# Patient Record
Sex: FEMALE | NOT HISPANIC OR LATINO | Employment: STUDENT | URBAN - METROPOLITAN AREA
[De-identification: names, ages, dates, MRNs, and addresses within clinical notes are randomized per-mention and may not be internally consistent; named-entity substitution may affect disease eponyms.]

---

## 2023-12-14 ENCOUNTER — TELEPHONE (OUTPATIENT)
Dept: INFECTIOUS DISEASES | Facility: CLINIC | Age: 58
End: 2023-12-14
Payer: COMMERCIAL

## 2023-12-14 NOTE — TELEPHONE ENCOUNTER
Called pt, no answer. Left voicemail about needing a referral for scheduling and to give the office a call back.

## 2023-12-14 NOTE — TELEPHONE ENCOUNTER
----- Message from Elizabeth Morse MA sent at 12/13/2023  4:35 PM CST -----  Judith Mensah Staff  Caller: [phone] (Today, 12:13 PM)  Pt calling in to schedule  np apt please  call to discuss further

## 2024-01-04 ENCOUNTER — OFFICE VISIT (OUTPATIENT)
Dept: INFECTIOUS DISEASES | Facility: CLINIC | Age: 59
End: 2024-01-04
Payer: COMMERCIAL

## 2024-01-04 ENCOUNTER — CLINICAL SUPPORT (OUTPATIENT)
Dept: INFECTIOUS DISEASES | Facility: CLINIC | Age: 59
End: 2024-01-04
Payer: COMMERCIAL

## 2024-01-04 ENCOUNTER — LAB VISIT (OUTPATIENT)
Dept: LAB | Facility: HOSPITAL | Age: 59
End: 2024-01-04
Attending: INTERNAL MEDICINE
Payer: COMMERCIAL

## 2024-01-04 VITALS
HEIGHT: 67 IN | BODY MASS INDEX: 31.21 KG/M2 | TEMPERATURE: 98 F | WEIGHT: 198.88 LBS | HEART RATE: 90 BPM | DIASTOLIC BLOOD PRESSURE: 79 MMHG | SYSTOLIC BLOOD PRESSURE: 135 MMHG

## 2024-01-04 DIAGNOSIS — B20 HUMAN IMMUNODEFICIENCY VIRUS (HIV) DISEASE: Primary | ICD-10-CM

## 2024-01-04 DIAGNOSIS — Z00.00 HEALTH MAINTENANCE EXAMINATION: ICD-10-CM

## 2024-01-04 DIAGNOSIS — B20 HUMAN IMMUNODEFICIENCY VIRUS (HIV) DISEASE: ICD-10-CM

## 2024-01-04 DIAGNOSIS — M17.0 PRIMARY OSTEOARTHRITIS OF BOTH KNEES: ICD-10-CM

## 2024-01-04 DIAGNOSIS — Z23 IMMUNIZATION DUE: ICD-10-CM

## 2024-01-04 LAB
ALBUMIN SERPL BCP-MCNC: 4.5 G/DL (ref 3.5–5.2)
ALP SERPL-CCNC: 66 U/L (ref 55–135)
ALT SERPL W/O P-5'-P-CCNC: 16 U/L (ref 10–44)
ANION GAP SERPL CALC-SCNC: 12 MMOL/L (ref 8–16)
AST SERPL-CCNC: 23 U/L (ref 10–40)
BASOPHILS # BLD AUTO: 0.06 K/UL (ref 0–0.2)
BASOPHILS NFR BLD: 0.8 % (ref 0–1.9)
BILIRUB SERPL-MCNC: 0.7 MG/DL (ref 0.1–1)
BILIRUB UR QL STRIP: NEGATIVE
BUN SERPL-MCNC: 19 MG/DL (ref 6–20)
CALCIUM SERPL-MCNC: 10.3 MG/DL (ref 8.7–10.5)
CHLORIDE SERPL-SCNC: 105 MMOL/L (ref 95–110)
CLARITY UR REFRACT.AUTO: CLEAR
CO2 SERPL-SCNC: 24 MMOL/L (ref 23–29)
COLOR UR AUTO: YELLOW
CREAT SERPL-MCNC: 1.1 MG/DL (ref 0.5–1.4)
DIFFERENTIAL METHOD BLD: ABNORMAL
EOSINOPHIL # BLD AUTO: 0 K/UL (ref 0–0.5)
EOSINOPHIL NFR BLD: 0.6 % (ref 0–8)
ERYTHROCYTE [DISTWIDTH] IN BLOOD BY AUTOMATED COUNT: 12.9 % (ref 11.5–14.5)
EST. GFR  (NO RACE VARIABLE): 58.6 ML/MIN/1.73 M^2
GLUCOSE SERPL-MCNC: 105 MG/DL (ref 70–110)
GLUCOSE UR QL STRIP: NEGATIVE
HAV IGG SER QL IA: REACTIVE
HBV CORE AB SERPL QL IA: NORMAL
HBV SURFACE AB SER-ACNC: 15.56 MIU/ML
HBV SURFACE AB SER-ACNC: REACTIVE M[IU]/ML
HBV SURFACE AG SERPL QL IA: NORMAL
HCT VFR BLD AUTO: 43 % (ref 37–48.5)
HCV AB SERPL QL IA: NORMAL
HGB BLD-MCNC: 14.5 G/DL (ref 12–16)
HGB UR QL STRIP: ABNORMAL
IMM GRANULOCYTES # BLD AUTO: 0.02 K/UL (ref 0–0.04)
IMM GRANULOCYTES NFR BLD AUTO: 0.3 % (ref 0–0.5)
KETONES UR QL STRIP: NEGATIVE
LEUKOCYTE ESTERASE UR QL STRIP: NEGATIVE
LYMPHOCYTES # BLD AUTO: 2.6 K/UL (ref 1–4.8)
LYMPHOCYTES NFR BLD: 36.4 % (ref 18–48)
MCH RBC QN AUTO: 31.2 PG (ref 27–31)
MCHC RBC AUTO-ENTMCNC: 33.7 G/DL (ref 32–36)
MCV RBC AUTO: 93 FL (ref 82–98)
MICROSCOPIC COMMENT: NORMAL
MONOCYTES # BLD AUTO: 0.5 K/UL (ref 0.3–1)
MONOCYTES NFR BLD: 7.4 % (ref 4–15)
NEUTROPHILS # BLD AUTO: 4 K/UL (ref 1.8–7.7)
NEUTROPHILS NFR BLD: 54.5 % (ref 38–73)
NITRITE UR QL STRIP: NEGATIVE
NRBC BLD-RTO: 0 /100 WBC
PH UR STRIP: 5 [PH] (ref 5–8)
PLATELET # BLD AUTO: 234 K/UL (ref 150–450)
PMV BLD AUTO: 12.5 FL (ref 9.2–12.9)
POTASSIUM SERPL-SCNC: 3.6 MMOL/L (ref 3.5–5.1)
PROT SERPL-MCNC: 8.5 G/DL (ref 6–8.4)
PROT UR QL STRIP: NEGATIVE
RBC # BLD AUTO: 4.65 M/UL (ref 4–5.4)
RBC #/AREA URNS AUTO: 0 /HPF (ref 0–4)
SODIUM SERPL-SCNC: 141 MMOL/L (ref 136–145)
SP GR UR STRIP: 1.02 (ref 1–1.03)
SQUAMOUS #/AREA URNS AUTO: 1 /HPF
URN SPEC COLLECT METH UR: ABNORMAL
WBC # BLD AUTO: 7.26 K/UL (ref 3.9–12.7)
WBC #/AREA URNS AUTO: 2 /HPF (ref 0–5)

## 2024-01-04 PROCEDURE — 86361 T CELL ABSOLUTE COUNT: CPT | Performed by: INTERNAL MEDICINE

## 2024-01-04 PROCEDURE — 86790 VIRUS ANTIBODY NOS: CPT | Performed by: INTERNAL MEDICINE

## 2024-01-04 PROCEDURE — 3075F SYST BP GE 130 - 139MM HG: CPT | Mod: CPTII,S$GLB,, | Performed by: INTERNAL MEDICINE

## 2024-01-04 PROCEDURE — 91322 SARSCOV2 VAC 50 MCG/0.5ML IM: CPT | Mod: S$GLB,,, | Performed by: INTERNAL MEDICINE

## 2024-01-04 PROCEDURE — 90480 ADMN SARSCOV2 VAC 1/ONLY CMP: CPT | Mod: S$GLB,,, | Performed by: INTERNAL MEDICINE

## 2024-01-04 PROCEDURE — 99999 PR PBB SHADOW E&M-EST. PATIENT-LVL III: CPT | Mod: PBBFAC,,, | Performed by: INTERNAL MEDICINE

## 2024-01-04 PROCEDURE — 99204 OFFICE O/P NEW MOD 45 MIN: CPT | Mod: S$GLB,,, | Performed by: INTERNAL MEDICINE

## 2024-01-04 PROCEDURE — 85025 COMPLETE CBC W/AUTO DIFF WBC: CPT | Performed by: INTERNAL MEDICINE

## 2024-01-04 PROCEDURE — 80053 COMPREHEN METABOLIC PANEL: CPT | Performed by: INTERNAL MEDICINE

## 2024-01-04 PROCEDURE — 1159F MED LIST DOCD IN RCRD: CPT | Mod: CPTII,S$GLB,, | Performed by: INTERNAL MEDICINE

## 2024-01-04 PROCEDURE — 87491 CHLMYD TRACH DNA AMP PROBE: CPT | Performed by: INTERNAL MEDICINE

## 2024-01-04 PROCEDURE — 81001 URINALYSIS AUTO W/SCOPE: CPT | Performed by: INTERNAL MEDICINE

## 2024-01-04 PROCEDURE — 3008F BODY MASS INDEX DOCD: CPT | Mod: CPTII,S$GLB,, | Performed by: INTERNAL MEDICINE

## 2024-01-04 PROCEDURE — 36415 COLL VENOUS BLD VENIPUNCTURE: CPT | Performed by: INTERNAL MEDICINE

## 2024-01-04 PROCEDURE — 86803 HEPATITIS C AB TEST: CPT | Performed by: INTERNAL MEDICINE

## 2024-01-04 PROCEDURE — 3078F DIAST BP <80 MM HG: CPT | Mod: CPTII,S$GLB,, | Performed by: INTERNAL MEDICINE

## 2024-01-04 PROCEDURE — 87536 HIV-1 QUANT&REVRSE TRNSCRPJ: CPT | Performed by: INTERNAL MEDICINE

## 2024-01-04 PROCEDURE — 87340 HEPATITIS B SURFACE AG IA: CPT | Performed by: INTERNAL MEDICINE

## 2024-01-04 PROCEDURE — 99999 PR PBB SHADOW E&M-EST. PATIENT-LVL I: CPT | Mod: PBBFAC,,,

## 2024-01-04 PROCEDURE — 86706 HEP B SURFACE ANTIBODY: CPT | Performed by: INTERNAL MEDICINE

## 2024-01-04 PROCEDURE — 1160F RVW MEDS BY RX/DR IN RCRD: CPT | Mod: CPTII,S$GLB,, | Performed by: INTERNAL MEDICINE

## 2024-01-04 PROCEDURE — 86704 HEP B CORE ANTIBODY TOTAL: CPT | Performed by: INTERNAL MEDICINE

## 2024-01-04 PROCEDURE — 86592 SYPHILIS TEST NON-TREP QUAL: CPT | Performed by: INTERNAL MEDICINE

## 2024-01-04 RX ORDER — DOLUTEGRAVIR SODIUM AND LAMIVUDINE 50; 300 MG/1; MG/1
1 TABLET, FILM COATED ORAL DAILY
Qty: 90 TABLET | Refills: 1 | Status: SHIPPED | OUTPATIENT
Start: 2024-01-04 | End: 2024-06-19 | Stop reason: SDUPTHER

## 2024-01-04 RX ORDER — DOLUTEGRAVIR SODIUM AND LAMIVUDINE 50; 300 MG/1; MG/1
TABLET, FILM COATED ORAL
COMMUNITY
End: 2024-01-04 | Stop reason: SDUPTHER

## 2024-01-04 NOTE — PROGRESS NOTES
Subjective:      Patient ID: Erin Johnson is a 58 y.o. female.    Chief Complaint:HIV Positive/AIDS      History of Present Illness    58 year with hIV diagnosed in .  In  contracted PJP while pregnant.  Hospitalized, intubated.  Started taking medications after her discharge.  She was started HAART.  She had shingles while her immune system was improving.  She is now currently on Dovato.  She has been tolerating Dovato very well.    Medical History  HIV diagnosed in .  Arthritis of the knees.    Surgical History  Right Knee surgery 2010    Medications  Dovato    Family History  Father  of colon cancer at age 60  Father had type 2 diabetes  Glaucoma  Skin cancer  Mother is 87 years of age    Social History  Occasionally smokes tobacco.  Occasionally drinks ETOH.  No illicit.  Living in [place] X 5 months.  Originally from [place].  Born and raised in [facility].  She has lived in [place], [place] and [place].  She has a 19 year old son. Speaks Maori.  No pets at home.  Lives alone.  Son lives in [place].  Worked in public health as a  (for 12 years).  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      Has a bad reaction to covid shots (manifests as headaches).    Colonoscopy was done last year.  Pap smear last year.      Review of Systems   Constitutional: Negative for chills, decreased appetite, fever, malaise/fatigue, night sweats, weight gain and weight loss.   HENT:  Negative for congestion, ear pain, hearing loss, hoarse voice, sore throat and tinnitus.    Eyes:  Negative for blurred vision, redness and visual disturbance.   Cardiovascular:  Negative for chest pain, leg swelling and palpitations.   Respiratory:  Negative for cough, hemoptysis, shortness of breath, sputum production and wheezing.    Hematologic/Lymphatic: Negative for adenopathy. Does not bruise/bleed easily.   Skin:  Negative for dry skin, itching, rash and  suspicious lesions.   Musculoskeletal:  Negative for back pain, joint pain, myalgias and neck pain.   Gastrointestinal:  Negative for abdominal pain, constipation, diarrhea, heartburn, nausea and vomiting.   Genitourinary:  Negative for dysuria, flank pain, frequency, hematuria, hesitancy and urgency.   Neurological:  Negative for dizziness, headaches, numbness, paresthesias and weakness.   Psychiatric/Behavioral:  Negative for depression and memory loss. The patient does not have insomnia and is not nervous/anxious.    Objective:   Physical Exam  Vitals and nursing note reviewed.   Constitutional:       General: She is not in acute distress.     Appearance: She is well-developed. She is not diaphoretic.   HENT:      Head: Normocephalic and atraumatic.      Right Ear: External ear normal.      Left Ear: External ear normal.      Nose: Nose normal.      Mouth/Throat:      Pharynx: No oropharyngeal exudate.   Eyes:      General: No scleral icterus.        Right eye: No discharge.         Left eye: No discharge.      Conjunctiva/sclera: Conjunctivae normal.      Pupils: Pupils are equal, round, and reactive to light.   Neck:      Thyroid: No thyromegaly.      Vascular: No JVD.      Trachea: No tracheal deviation.   Cardiovascular:      Rate and Rhythm: Normal rate and regular rhythm.      Heart sounds: No murmur heard.     No friction rub. No gallop.   Pulmonary:      Effort: Pulmonary effort is normal. No respiratory distress.      Breath sounds: Normal breath sounds. No stridor. No wheezing or rales.   Chest:      Chest wall: No tenderness.   Abdominal:      General: Bowel sounds are normal. There is no distension.      Palpations: Abdomen is soft. There is no mass.      Tenderness: There is no abdominal tenderness. There is no guarding or rebound.   Musculoskeletal:         General: No tenderness. Normal range of motion.      Cervical back: Normal range of motion and neck supple.   Lymphadenopathy:      Cervical: No  cervical adenopathy.   Skin:     General: Skin is warm.      Coloration: Skin is not pale.      Findings: No erythema or rash.   Neurological:      Mental Status: She is alert and oriented to person, place, and time.      Cranial Nerves: No cranial nerve deficit.      Motor: No abnormal muscle tone.      Coordination: Coordination normal.      Deep Tendon Reflexes: Reflexes normal.   Psychiatric:         Behavior: Behavior normal.         Thought Content: Thought content normal.         Judgment: Judgment normal.       Assessment:     1. Human immunodeficiency virus (HIV) disease :  Continue on dovato.  Will check labs today.   2. Health maintenance examination :  Will order mammogram.   3. Primary osteoarthritis of both knees :  Will check x-ray of knees.   4. Immunization due :  Will give covid vaccine.       Plan:      Human immunodeficiency virus (HIV) disease  -     CD4 T-[place] Cells; Future; Expected date: 01/04/2024  -     Comprehensive Metabolic Panel; Future; Expected date: 01/04/2024  -     HIV RNA, Quantitative, PCR; Future; Expected date: 01/04/2024  -     CBC Auto Differential; Future; Expected date: 01/04/2024  -     RPR; Future; Expected date: 01/04/2024  -     FTA antibodies, IgG and IgM; Future; Expected date: 01/03/2025  -     Hepatitis B Core Antibody, Total; Future; Expected date: 01/04/2024  -     Hepatitis B Surface Ab, Qualitative; Future; Expected date: 01/04/2024  -     Hepatitis B Surface Antigen; Future; Expected date: 01/04/2024  -     Hepatitis C Antibody; Future; Expected date: 01/04/2024  -     Hepatitis A antibody, IgG; Future; Expected date: 01/04/2024  -     Urinalysis  -     C. trachomatis/N. gonorrhoeae by AMP DNA  -     DOVATO  mg Tab; Take 1 tablet by mouth Daily.  Dispense: 90 tablet; Refill: 1    Health maintenance examination  -     Mammo Digital Diagnostic Bilat; Future; Expected date: 01/04/2024    Primary osteoarthritis of both knees  -     X-Ray Knee 3 View  Bilateral; Future; Expected date: 01/04/2024    Immunization due  -     COVID-19 VAC, MRNA 2023 (MODERNA)(PF) 50 MCG/0.5 ML IM SUSR (12 YRS AND UP); Future; Expected date: 01/04/2024    Other orders  -     Urinalysis Microscopic

## 2024-01-04 NOTE — PROGRESS NOTES
Patient received covid Moderna spike vax IM to the right deltoid.  Tolerated well and left in NAD

## 2024-01-05 LAB
C TRACH DNA SPEC QL NAA+PROBE: NOT DETECTED
CD3+CD4+ CELLS # BLD: 949 CELLS/UL (ref 300–1400)
CD3+CD4+ CELLS NFR BLD: 38.9 % (ref 28–57)
HIV1 RNA # SERPL NAA+PROBE: NOT DETECTED COPIES/ML
HIV1 RNA SERPL QL NAA+PROBE: NOT DETECTED
N GONORRHOEA DNA SPEC QL NAA+PROBE: NOT DETECTED
RPR SER QL: NORMAL

## 2024-02-26 ENCOUNTER — TELEPHONE (OUTPATIENT)
Dept: INFECTIOUS DISEASES | Facility: CLINIC | Age: 59
End: 2024-02-26
Payer: COMMERCIAL

## 2024-04-02 ENCOUNTER — HOSPITAL ENCOUNTER (OUTPATIENT)
Dept: RADIOLOGY | Facility: HOSPITAL | Age: 59
Discharge: HOME | End: 2024-04-02
Attending: INTERNAL MEDICINE
Payer: COMMERCIAL

## 2024-04-02 DIAGNOSIS — M17.0 PRIMARY OSTEOARTHRITIS OF BOTH KNEES: ICD-10-CM

## 2024-04-02 PROCEDURE — 73562 X-RAY EXAM OF KNEE 3: CPT | Mod: TC,50

## 2024-04-02 PROCEDURE — 73562 X-RAY EXAM OF KNEE 3: CPT | Mod: 26,50,, | Performed by: RADIOLOGY

## 2024-06-19 DIAGNOSIS — B20 HUMAN IMMUNODEFICIENCY VIRUS (HIV) DISEASE: ICD-10-CM

## 2024-06-19 RX ORDER — DOLUTEGRAVIR SODIUM AND LAMIVUDINE 50; 300 MG/1; MG/1
1 TABLET, FILM COATED ORAL DAILY
Qty: 90 TABLET | Refills: 1 | Status: SHIPPED | OUTPATIENT
Start: 2024-06-19 | End: 2024-12-16

## 2024-07-25 ENCOUNTER — OFFICE VISIT (OUTPATIENT)
Dept: INFECTIOUS DISEASES | Facility: CLINIC | Age: 59
End: 2024-07-25
Payer: COMMERCIAL

## 2024-07-25 ENCOUNTER — LAB VISIT (OUTPATIENT)
Dept: LAB | Facility: HOSPITAL | Age: 59
End: 2024-07-25
Attending: INTERNAL MEDICINE
Payer: COMMERCIAL

## 2024-07-25 ENCOUNTER — PATIENT MESSAGE (OUTPATIENT)
Dept: SPORTS MEDICINE | Facility: CLINIC | Age: 59
End: 2024-07-25
Payer: COMMERCIAL

## 2024-07-25 VITALS
SYSTOLIC BLOOD PRESSURE: 110 MMHG | TEMPERATURE: 98 F | WEIGHT: 190.69 LBS | BODY MASS INDEX: 29.93 KG/M2 | HEART RATE: 80 BPM | DIASTOLIC BLOOD PRESSURE: 74 MMHG | HEIGHT: 67 IN

## 2024-07-25 DIAGNOSIS — B20 HUMAN IMMUNODEFICIENCY VIRUS (HIV) DISEASE: ICD-10-CM

## 2024-07-25 DIAGNOSIS — L98.9 SKIN LESION: ICD-10-CM

## 2024-07-25 DIAGNOSIS — B20 HUMAN IMMUNODEFICIENCY VIRUS (HIV) DISEASE: Primary | ICD-10-CM

## 2024-07-25 DIAGNOSIS — M17.0 PRIMARY OSTEOARTHRITIS OF BOTH KNEES: ICD-10-CM

## 2024-07-25 DIAGNOSIS — Z00.00 HEALTH MAINTENANCE EXAMINATION: ICD-10-CM

## 2024-07-25 LAB
ALBUMIN SERPL BCP-MCNC: 4.2 G/DL (ref 3.5–5.2)
ALP SERPL-CCNC: 65 U/L (ref 55–135)
ALT SERPL W/O P-5'-P-CCNC: 26 U/L (ref 10–44)
ANION GAP SERPL CALC-SCNC: 8 MMOL/L (ref 8–16)
AST SERPL-CCNC: 28 U/L (ref 10–40)
BACTERIA #/AREA URNS AUTO: NORMAL /HPF
BASOPHILS # BLD AUTO: 0.06 K/UL (ref 0–0.2)
BASOPHILS NFR BLD: 0.9 % (ref 0–1.9)
BILIRUB SERPL-MCNC: 0.8 MG/DL (ref 0.1–1)
BILIRUB UR QL STRIP: NEGATIVE
BUN SERPL-MCNC: 9 MG/DL (ref 6–20)
CALCIUM SERPL-MCNC: 9.8 MG/DL (ref 8.7–10.5)
CHLORIDE SERPL-SCNC: 104 MMOL/L (ref 95–110)
CHOLEST SERPL-MCNC: 265 MG/DL (ref 120–199)
CHOLEST/HDLC SERPL: 4 {RATIO} (ref 2–5)
CLARITY UR REFRACT.AUTO: CLEAR
CO2 SERPL-SCNC: 26 MMOL/L (ref 23–29)
COLOR UR AUTO: YELLOW
CREAT SERPL-MCNC: 1.1 MG/DL (ref 0.5–1.4)
DIFFERENTIAL METHOD BLD: NORMAL
EOSINOPHIL # BLD AUTO: 0.1 K/UL (ref 0–0.5)
EOSINOPHIL NFR BLD: 1.5 % (ref 0–8)
ERYTHROCYTE [DISTWIDTH] IN BLOOD BY AUTOMATED COUNT: 13 % (ref 11.5–14.5)
EST. GFR  (NO RACE VARIABLE): 58.2 ML/MIN/1.73 M^2
ESTIMATED AVG GLUCOSE: 114 MG/DL (ref 68–131)
GLUCOSE SERPL-MCNC: 89 MG/DL (ref 70–110)
GLUCOSE UR QL STRIP: NEGATIVE
HBA1C MFR BLD: 5.6 % (ref 4–5.6)
HCT VFR BLD AUTO: 42.6 % (ref 37–48.5)
HDLC SERPL-MCNC: 66 MG/DL (ref 40–75)
HDLC SERPL: 24.9 % (ref 20–50)
HGB BLD-MCNC: 14.1 G/DL (ref 12–16)
HGB UR QL STRIP: NEGATIVE
IMM GRANULOCYTES # BLD AUTO: 0.01 K/UL (ref 0–0.04)
IMM GRANULOCYTES NFR BLD AUTO: 0.1 % (ref 0–0.5)
KETONES UR QL STRIP: NEGATIVE
LDLC SERPL CALC-MCNC: 177.6 MG/DL (ref 63–159)
LEUKOCYTE ESTERASE UR QL STRIP: ABNORMAL
LYMPHOCYTES # BLD AUTO: 2.9 K/UL (ref 1–4.8)
LYMPHOCYTES NFR BLD: 42.9 % (ref 18–48)
MCH RBC QN AUTO: 31 PG (ref 27–31)
MCHC RBC AUTO-ENTMCNC: 33.1 G/DL (ref 32–36)
MCV RBC AUTO: 94 FL (ref 82–98)
MICROSCOPIC COMMENT: NORMAL
MONOCYTES # BLD AUTO: 0.5 K/UL (ref 0.3–1)
MONOCYTES NFR BLD: 6.9 % (ref 4–15)
NEUTROPHILS # BLD AUTO: 3.2 K/UL (ref 1.8–7.7)
NEUTROPHILS NFR BLD: 47.7 % (ref 38–73)
NITRITE UR QL STRIP: NEGATIVE
NONHDLC SERPL-MCNC: 199 MG/DL
NRBC BLD-RTO: 0 /100 WBC
PH UR STRIP: 5 [PH] (ref 5–8)
PLATELET # BLD AUTO: 246 K/UL (ref 150–450)
PMV BLD AUTO: 11.8 FL (ref 9.2–12.9)
POTASSIUM SERPL-SCNC: 4.2 MMOL/L (ref 3.5–5.1)
PROT SERPL-MCNC: 7.8 G/DL (ref 6–8.4)
PROT UR QL STRIP: NEGATIVE
RBC # BLD AUTO: 4.55 M/UL (ref 4–5.4)
RBC #/AREA URNS AUTO: 1 /HPF (ref 0–4)
SODIUM SERPL-SCNC: 138 MMOL/L (ref 136–145)
SP GR UR STRIP: 1.02 (ref 1–1.03)
SQUAMOUS #/AREA URNS AUTO: 5 /HPF
TRIGL SERPL-MCNC: 107 MG/DL (ref 30–150)
URN SPEC COLLECT METH UR: ABNORMAL
WBC # BLD AUTO: 6.8 K/UL (ref 3.9–12.7)
WBC #/AREA URNS AUTO: 1 /HPF (ref 0–5)

## 2024-07-25 PROCEDURE — 83036 HEMOGLOBIN GLYCOSYLATED A1C: CPT | Performed by: INTERNAL MEDICINE

## 2024-07-25 PROCEDURE — 80053 COMPREHEN METABOLIC PANEL: CPT | Performed by: INTERNAL MEDICINE

## 2024-07-25 PROCEDURE — 99215 OFFICE O/P EST HI 40 MIN: CPT | Mod: S$GLB,,, | Performed by: INTERNAL MEDICINE

## 2024-07-25 PROCEDURE — 3044F HG A1C LEVEL LT 7.0%: CPT | Mod: CPTII,S$GLB,, | Performed by: INTERNAL MEDICINE

## 2024-07-25 PROCEDURE — 81001 URINALYSIS AUTO W/SCOPE: CPT | Performed by: INTERNAL MEDICINE

## 2024-07-25 PROCEDURE — 85025 COMPLETE CBC W/AUTO DIFF WBC: CPT | Performed by: INTERNAL MEDICINE

## 2024-07-25 PROCEDURE — 86361 T CELL ABSOLUTE COUNT: CPT | Performed by: INTERNAL MEDICINE

## 2024-07-25 PROCEDURE — 36415 COLL VENOUS BLD VENIPUNCTURE: CPT | Performed by: INTERNAL MEDICINE

## 2024-07-25 PROCEDURE — 3074F SYST BP LT 130 MM HG: CPT | Mod: CPTII,S$GLB,, | Performed by: INTERNAL MEDICINE

## 2024-07-25 PROCEDURE — 99999 PR PBB SHADOW E&M-EST. PATIENT-LVL IV: CPT | Mod: PBBFAC,,, | Performed by: INTERNAL MEDICINE

## 2024-07-25 PROCEDURE — 87536 HIV-1 QUANT&REVRSE TRNSCRPJ: CPT | Performed by: INTERNAL MEDICINE

## 2024-07-25 PROCEDURE — 3078F DIAST BP <80 MM HG: CPT | Mod: CPTII,S$GLB,, | Performed by: INTERNAL MEDICINE

## 2024-07-25 PROCEDURE — 3008F BODY MASS INDEX DOCD: CPT | Mod: CPTII,S$GLB,, | Performed by: INTERNAL MEDICINE

## 2024-07-25 PROCEDURE — 80061 LIPID PANEL: CPT | Performed by: INTERNAL MEDICINE

## 2024-07-25 RX ORDER — TRIAZOLAM 0.25 MG/1
TABLET ORAL
COMMUNITY
Start: 2024-07-08

## 2024-07-25 NOTE — PROGRESS NOTES
Subjective     Patient ID: Erin Johnson is a 59 y.o. female.    Chief Complaint:Follow-up      History of Present Illness    59 year with HIV diagnosed in .  In  contracted PJP while pregnant.  Hospitalized, intubated.  Started taking medications after her discharge.  She was started HAART.  She had shingles while her immune system was improving.  She is now currently on Dovato.  She has been tolerating Dovato very well.    Interval History  - Graduating on .  Intends to work in juvenile justice.    - Sees a therapist.  - Lost 10 lbs since last visit.  Has been doing intermittent fasting.  Goal is to get to 170.  Takes metamucil daily.  - R>L knee pain. Pain at 7 out of 10.  Occasional takes ibuprofen.       Medical History  HIV diagnosed in .  PJP pneumonia  Arthritis of the knees  Has a bad reaction to covid shots (manifests as headaches).    Colonoscopy was done in  or .  Pap smear in  or ..     Surgical History  Right Knee surgery      Medications  Dovato     Family History  Father  of colon cancer at age 60  Father had type 2 diabetes  Glaucoma  Skin cancer  Mother is 87 years of age     Social History  Occasionally smokes tobacco.  Occasionally drinks ETOH.  No illicit.  Living in [place] X 5 months.  Originally from [place].  Born and raised in [facility].  She has lived in [place], [place] and [place].  She has a 19 year old son. Speaks Citizen of [place].  No pets at home.  Lives alone.  Son lives in [place].  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           Review of Systems   Constitutional: Negative for chills, decreased appetite, fever, malaise/fatigue, night sweats, weight gain and weight loss.   HENT:  Positive for tinnitus. Negative for congestion, ear pain, hearing loss, hoarse voice and sore throat.    Eyes:  Negative for blurred vision, redness and  visual disturbance.   Cardiovascular:  Negative for chest pain, leg swelling and palpitations.   Respiratory:  Negative for cough, hemoptysis, shortness of breath, sputum production and wheezing.    Hematologic/Lymphatic: Negative for adenopathy. Does not bruise/bleed easily.   Skin:  Negative for dry skin, itching, rash and suspicious lesions.   Musculoskeletal:  Positive for joint pain. Negative for back pain, myalgias and neck pain.   Gastrointestinal:  Negative for abdominal pain, constipation, diarrhea, heartburn, nausea and vomiting.   Genitourinary:  Negative for dysuria, flank pain, frequency, hematuria, hesitancy and urgency.   Neurological:  Negative for dizziness, headaches, numbness, paresthesias and weakness.   Psychiatric/Behavioral:  Negative for depression and memory loss. The patient does not have insomnia and is not nervous/anxious.       Objective   Physical Exam  Vitals and nursing note reviewed.   Constitutional:       General: She is not in acute distress.     Appearance: She is well-developed. She is not diaphoretic.   HENT:      Head: Normocephalic and atraumatic.      Right Ear: External ear normal.      Left Ear: External ear normal.      Nose: Nose normal.      Mouth/Throat:      Pharynx: No oropharyngeal exudate.   Eyes:      General: No scleral icterus.        Right eye: No discharge.         Left eye: No discharge.      Conjunctiva/sclera: Conjunctivae normal.      Pupils: Pupils are equal, round, and reactive to light.   Neck:      Thyroid: No thyromegaly.      Vascular: No JVD.      Trachea: No tracheal deviation.   Cardiovascular:      Rate and Rhythm: Normal rate and regular rhythm.      Heart sounds: No murmur heard.     No friction rub. No gallop.   Pulmonary:      Effort: Pulmonary effort is normal. No respiratory distress.      Breath sounds: Normal breath sounds. No stridor. No wheezing or rales.   Chest:      Chest wall: No tenderness.   Abdominal:      General: Bowel sounds  are normal. There is no distension.      Palpations: Abdomen is soft. There is no mass.      Tenderness: There is no abdominal tenderness. There is no guarding or rebound.   Musculoskeletal:         General: No tenderness. Normal range of motion.      Cervical back: Normal range of motion and neck supple.   Lymphadenopathy:      Cervical: No cervical adenopathy.   Skin:     General: Skin is warm.      Coloration: Skin is not pale.      Findings: No erythema or rash.   Neurological:      Mental Status: She is alert and oriented to person, place, and time.      Cranial Nerves: No cranial nerve deficit.      Motor: No abnormal muscle tone.      Coordination: Coordination normal.      Deep Tendon Reflexes: Reflexes normal.   Psychiatric:         Behavior: Behavior normal.         Thought Content: Thought content normal.         Judgment: Judgment normal.            Assessment and Plan     1. Human immunodeficiency virus (HIV) disease :  Will check labs today.  Patient is to continue on dovato.   2. Health maintenance examination :  will order mammogram   3. Skin lesion :  Will refer to dermatology.   4. Primary osteoarthritis of both knees :  Will refer to orthopedic surgery.       Plan:  Human immunodeficiency virus (HIV) disease  -     CD4 T-[place] Cells; Future; Expected date: 07/25/2024  -     HIV RNA, Quantitative, PCR; Future; Expected date: 07/25/2024  -     Comprehensive Metabolic Panel; Future; Expected date: 07/25/2024  -     CBC Auto Differential; Future; Expected date: 07/25/2024  -     Cancel: Urinalysis; Future; Expected date: 07/25/2024  -     Lipid Panel; Future; Expected date: 07/25/2024  -     Hemoglobin A1C; Future; Expected date: 07/25/2024  -     Urinalysis    Health maintenance examination  -     Mammo Digital Diagnostic Bilat; Future; Expected date: 07/25/2024    Skin lesion  -     Ambulatory referral/consult to Dermatology; Future; Expected date: 08/01/2024    Primary osteoarthritis of both  knees  -     Ambulatory referral/consult to Orthopedics; Future; Expected date: 08/01/2024    Other orders  -     Urinalysis Microscopic

## 2024-07-25 NOTE — PROGRESS NOTES
CC: bilateral knee pain    59 y.o. Female presents today for evaluation of her bilateral knee pain.    SYMPTOMS:   Pain Score:  Pain location:   Time of onset:   Trauma, injury:      Audible pop:   Clicking:   Catching:   Locking:   Giving out, instability:   Swelling:   Theater sign:   Problems with stairs:     INTERVENTIONS:   Medications tried:   Braces/devices:   Physical therapy:   Injections:     RELEVANT HISTORY:   Imaging to date:   Previous significant knee injuries:   Previous knee surgeries:    Occupation:     REVIEW OF SYSTEMS:   Constitution: Patient denies fever or chills.  Eyes: Patient denies eye pain or vision changes.  HEENT: Patient denies ear pain, sore throat, or nasal discharge.  CVS: Patient denies chest pain.  Lungs: Patient denies shortness of breath or cough.  Abdomen: Patient denies any stomach pain, nausea, vomiting, or diarrhea  Skin: Patient denies skin rash or itching.    Musculoskeletal: Patient denies recent injuries or trauma.  Neuro: Patient denies any numbness or tingling in upper or lower extremities.  Psych: Patient denies any current anxiety or nervousness.    PAST MEDICAL HISTORY:   No past medical history on file.    PAST SURGICAL HISTORY:  No past surgical history on file.    FAMILY HISTORY:  No family history on file.    SOCIAL HISTORY:  Social History     Socioeconomic History    Marital status: Single     Social Determinants of Health     Financial Resource Strain: Low Risk  (1/3/2024)    Overall Financial Resource Strain (CARDIA)     Difficulty of Paying Living Expenses: Not hard at all   Food Insecurity: No Food Insecurity (1/3/2024)    Hunger Vital Sign     Worried About Running Out of Food in the Last Year: Never true     Ran Out of Food in the Last Year: Never true   Transportation Needs: No Transportation Needs (1/3/2024)    PRAPARE - Transportation     Lack of Transportation (Medical): No     Lack of Transportation (Non-Medical): No   Physical Activity:  Insufficiently Active (1/3/2024)    Exercise Vital Sign     Days of Exercise per Week: 3 days     Minutes of Exercise per Session: 40 min   Stress: No Stress Concern Present (1/3/2024)    [employer] - Occupational Stress Questionnaire     Feeling of Stress : Only a little   Housing Stability: Low Risk  (1/3/2024)    Housing Stability Vital Sign     Unable to Pay for Housing in the Last Year: No     Number of Places Lived in the Last Year: 2     Unstable Housing in the Last Year: No       MEDICATIONS:     Current Outpatient Medications:     DOVATO  mg Tab, Take 1 tablet by mouth Daily., Disp: 90 tablet, Rfl: 1    triazolam 0.25 MG tablet, Take by mouth., Disp: , Rfl:     ALLERGIES:   Review of patient's allergies indicates:  No Known Allergies     PHYSICAL EXAMINATION:  There were no vitals taken for this visit.  Vitals signs and nursing note have been reviewed.    General: In no acute distress, well developed, well nourished, no diaphoresis  Eyes: EOM full and smooth, no eye redness or discharge  HEENT: normocephalic and atraumatic, neck supple, trachea midline, no nasal discharge  Cardiovascular: no LE edema  Lungs: respirations non-labored, no conversational dyspnea   Neuro: AAOx3, CN2-12 grossly intact  Skin: No rashes, warm and dry  Psychiatric: cooperative, pleasant, mood and affect appropriate for age    Bilateral Knee:   Gait: ***    Inspection/Palpation:   -Rubor   -Calor  -Effusion   -Patella ballotable   -Patellar apprehension  -Retinacular tenderness   -Patellar crepitus   Patellar tilt grossly normal     TTP at:  -Joint line   -MCL   -LCL   -Popliteal region   -Quad tendon   -Patella  -Pat tendon  -Pat border  -Med condyle   -Lat condyle   -Pes   -Prox fibula   -Tib tub  -Gerdy's tubercle  -Distal Hamstring tendons  -Proximal Hamstrings/Ischial tuberosity  -ITB    ROM:   Ext: ***°   Flex:***°   Popliteal Angle: ***°   -Discomfort w/ full flex   -Bounce-home discomfort      Ligamentous:   -Ant drawer   -Post drawer   -Lachman's   Good endpoints & no pain w/ valgus & varus stress    Meniscal:  -Leonel's   -Zelda   -Thessaly   -Pain w/ squat     Other:  -Patellar apprehension  -Patellar grind  -Ventura's   -J sign  -Carlos's  Abductors     IMAGIN. Knee X-ray ordered due to bilateral knee pain. 3 views taken 24.   2. X-ray images were reviewed personally by me and then directly with patient.  3. FINDINGS: Right knee:     There are degenerative changes of the knee noting primarily lateral compartmental narrowing.  There is osteopenia.  No large knee joint effusion.  No acute displaced fracture or dislocation of the right knee.     Left knee:     There are degenerative changes of the knee.  No acute displaced fracture or dislocation of the knee.  No large knee joint effusion.    4. IMPRESSION:  1. No acute displaced fracture or dislocation of the left or right knee noting degenerative changes.     IMAGIN. Knee X-ray ordered due to bilateral knee pain. 2 views taken today.   2. X-ray images were reviewed personally by me and then directly with patient.  3. FINDINGS: ***    4. IMPRESSION:  *** No acute osseous abnormalities appreciated.    ASSESSMENT:    No diagnosis found.      PLAN:  ***    Future planning includes - ***    All questions were answered to the best of my ability and all concerns were addressed at this time.    Follow up in *** week(s) for above, or sooner if needed.      This note is dictated using the M*Modal Fluency Direct word recognition program. There are word recognition mistakes that are occasionally missed on review.

## 2024-07-26 ENCOUNTER — PATIENT MESSAGE (OUTPATIENT)
Dept: INFECTIOUS DISEASES | Facility: CLINIC | Age: 59
End: 2024-07-26
Payer: COMMERCIAL

## 2024-07-26 LAB
CD3+CD4+ CELLS # BLD: 1186 CELLS/UL (ref 300–1400)
CD3+CD4+ CELLS NFR BLD: 38.1 % (ref 28–57)
HIV1 RNA # SERPL NAA+PROBE: NOT DETECTED COPIES/ML
HIV1 RNA SERPL QL NAA+PROBE: NOT DETECTED

## 2024-07-30 ENCOUNTER — TELEPHONE (OUTPATIENT)
Dept: INFECTIOUS DISEASES | Facility: CLINIC | Age: 59
End: 2024-07-30
Payer: COMMERCIAL

## 2024-08-01 ENCOUNTER — HOSPITAL ENCOUNTER (OUTPATIENT)
Dept: RADIOLOGY | Facility: HOSPITAL | Age: 59
Discharge: HOME | End: 2024-08-01
Attending: STUDENT IN AN ORGANIZED HEALTH CARE EDUCATION/TRAINING PROGRAM
Payer: COMMERCIAL

## 2024-08-01 ENCOUNTER — TELEPHONE (OUTPATIENT)
Dept: INFECTIOUS DISEASES | Facility: CLINIC | Age: 59
End: 2024-08-01
Payer: COMMERCIAL

## 2024-08-01 ENCOUNTER — OFFICE VISIT (OUTPATIENT)
Dept: SPORTS MEDICINE | Facility: CLINIC | Age: 59
End: 2024-08-01
Payer: COMMERCIAL

## 2024-08-01 VITALS
DIASTOLIC BLOOD PRESSURE: 79 MMHG | WEIGHT: 190.69 LBS | SYSTOLIC BLOOD PRESSURE: 111 MMHG | HEIGHT: 66 IN | BODY MASS INDEX: 30.65 KG/M2 | HEART RATE: 76 BPM

## 2024-08-01 DIAGNOSIS — M17.0 PRIMARY OSTEOARTHRITIS OF BOTH KNEES: ICD-10-CM

## 2024-08-01 DIAGNOSIS — E78.2 MIXED HYPERLIPIDEMIA: Primary | ICD-10-CM

## 2024-08-01 DIAGNOSIS — M25.562 BILATERAL CHRONIC KNEE PAIN: ICD-10-CM

## 2024-08-01 DIAGNOSIS — G89.29 BILATERAL CHRONIC KNEE PAIN: ICD-10-CM

## 2024-08-01 DIAGNOSIS — M25.561 BILATERAL CHRONIC KNEE PAIN: ICD-10-CM

## 2024-08-01 DIAGNOSIS — M17.0 PRIMARY OSTEOARTHRITIS OF BOTH KNEES: Primary | ICD-10-CM

## 2024-08-01 DIAGNOSIS — Z98.890 H/O ARTHROSCOPY OF RIGHT KNEE: ICD-10-CM

## 2024-08-01 PROCEDURE — 73560 X-RAY EXAM OF KNEE 1 OR 2: CPT | Mod: 26,50,, | Performed by: INTERNAL MEDICINE

## 2024-08-01 PROCEDURE — 99999 PR PBB SHADOW E&M-EST. PATIENT-LVL III: CPT | Mod: PBBFAC,,, | Performed by: STUDENT IN AN ORGANIZED HEALTH CARE EDUCATION/TRAINING PROGRAM

## 2024-08-01 PROCEDURE — 73560 X-RAY EXAM OF KNEE 1 OR 2: CPT | Mod: TC,50

## 2024-08-01 RX ORDER — TRIAMCINOLONE ACETONIDE 40 MG/ML
40 INJECTION, SUSPENSION INTRA-ARTICULAR; INTRAMUSCULAR
Status: DISCONTINUED | OUTPATIENT
Start: 2024-08-01 | End: 2024-08-01 | Stop reason: HOSPADM

## 2024-08-01 RX ORDER — PRAVASTATIN SODIUM 10 MG/1
10 TABLET ORAL DAILY
Qty: 90 TABLET | Refills: 3 | Status: SHIPPED | OUTPATIENT
Start: 2024-08-01 | End: 2025-08-01

## 2024-08-01 RX ADMIN — TRIAMCINOLONE ACETONIDE 40 MG: 40 INJECTION, SUSPENSION INTRA-ARTICULAR; INTRAMUSCULAR at 11:08

## 2024-08-01 NOTE — PROGRESS NOTES
CC: bilateral knee pain    59 y.o. Female presents today for evaluation of her bilateral knee pain. She states she has been having bilateral knee pain since 2011, and has been progressively worse. She states R>L, previous menisectomy in 2010 on right knee. She has recently moved from [facility] and has seen multiple doctors that have prescribed fPT, without relief. She takes ibuprofen intermitted with minimal relief.  She has never had injections in either knee. She states her R knee is a 6/10 and L knee is 2/10. She localizes her pain to medial and lateral joint line. She states that she also has intermitted pain in her posterior knee.     SYMPTOMS: She states her pain is sharp. Bilateral Medial/lateral joint line pain   Pain Score: R 6/10, L 2/10  Pain location: medial and lateral joint line   Time of onset: 2011  Trauma, injury: none    Audible pop: popping with motion   Clicking: yes   Catching: yes  Locking: none  Giving out, instability: none  Swelling: some swelling on R   Theater sign: positive  Problems with stairs: pain with going down stairs     INTERVENTIONS:   Medications tried: ibuprofen   Braces/devices: OTC bracing without minimal relief.  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     REVIEW OF SYSTEMS:   Constitution: Patient denies fever or chills.  Eyes: Patient denies eye pain or vision changes.  HEENT: Patient denies ear pain, sore throat, or nasal discharge.  CVS: Patient denies chest pain.  Lungs: Patient denies shortness of breath or cough.  Abdomen: Patient denies any stomach pain, nausea, vomiting, or diarrhea  Skin: Patient denies skin rash or itching.    Musculoskeletal: Patient denies recent injuries or trauma.  Neuro: Patient denies any numbness or tingling in upper or lower  "extremities.  Psych: Patient denies any current anxiety or nervousness.    PAST MEDICAL HISTORY:   No past medical history on file.    PAST SURGICAL HISTORY:  No past surgical history on file.    FAMILY HISTORY:  No family history on file.    SOCIAL HISTORY:  Social History     Socioeconomic History    Marital status: Single     Social Determinants of Health     Financial Resource Strain: Low Risk  (1/3/2024)    Overall Financial Resource Strain (CARDIA)     Difficulty of Paying Living Expenses: Not hard at all   Food Insecurity: No Food Insecurity (1/3/2024)    Hunger Vital Sign     Worried About Running Out of Food in the Last Year: Never true     Ran Out of Food in the Last Year: Never true   Transportation Needs: No Transportation Needs (1/3/2024)    PRAPARE - Transportation     Lack of Transportation (Medical): No     Lack of Transportation (Non-Medical): No   Physical Activity: Insufficiently Active (1/3/2024)    Exercise Vital Sign     Days of Exercise per Week: 3 days     Minutes of Exercise per Session: 40 min   Stress: No Stress Concern Present (1/3/2024)    [place] [employer] [place] [employer] - Occupational Stress Questionnaire     Feeling of Stress : Only a little   Housing Stability: Low Risk  (1/3/2024)    Housing Stability Vital Sign     Unable to Pay for Housing in the Last Year: No     Number of Places Lived in the Last Year: 2     Unstable Housing in the Last Year: No       MEDICATIONS:     Current Outpatient Medications:     DOVATO  mg Tab, Take 1 tablet by mouth Daily., Disp: 90 tablet, Rfl: 1    pravastatin (PRAVACHOL) 10 MG tablet, Take 1 tablet (10 mg total) by mouth once daily., Disp: 90 tablet, Rfl: 3    triazolam 0.25 MG tablet, Take by mouth., Disp: , Rfl:     ALLERGIES:   Review of patient's allergies indicates:  No Known Allergies     PHYSICAL EXAMINATION:  /79   Pulse 76   Ht 5' 6" (1.676 m)   Wt 86.5 kg (190 lb 11.2 oz)   BMI 30.78 kg/m²   Vitals signs and nursing " note have been reviewed.    General: In no acute distress, well developed, well nourished, no diaphoresis  Eyes: EOM full and smooth, no eye redness or discharge  HEENT: normocephalic and atraumatic, neck supple, trachea midline, no nasal discharge  Cardiovascular: no LE edema  Lungs: respirations non-labored, no conversational dyspnea   Neuro: AAOx3, CN2-12 grossly intact  Skin: No rashes, warm and dry  Psychiatric: cooperative, pleasant, mood and affect appropriate for age    Bilateral Knee:   Gait: wnl    Inspection/Palpation:   -Rubor   -Calor  -Effusion   -Patella ballotable   -Patellar apprehension  -Retinacular tenderness   +Patellar crepitus   Patellar tilt grossly normal     TTP at:  +Joint line   -MCL   -LCL   -Popliteal region   -Quad tendon   -Patella  -Pat tendon  -Pat border  -Med condyle   -Lat condyle   -Pes   -Prox fibula   -Tib tub  -Gerdy's tubercle  -Distal Hamstring tendons  -Proximal Hamstrings/Ischial tuberosity  -ITB    ROM:   Ext: 0°   Flex:145°   Popliteal Angle: 30°   +Discomfort w/ full flex   -Bounce-home discomfort     Ligamentous:   -Ant drawer   -Post drawer   -Lachman's   Good endpoints & no pain w/ valgus & varus stress    Meniscal:  -Leonel's   -Zelda   -Thessaly   -Pain w/ squat     Other:  -Patellar apprehension  -Patellar grind  -Ventura's   -J sign  -Carlos's  Abductors 5/5    IMAGIN. Knee X-ray ordered due to bilateral knee pain. 3 views taken 24.   2. X-ray images were reviewed personally by me and then directly with patient.  3. FINDINGS: Right knee:     There are degenerative changes of the knee noting primarily lateral compartmental narrowing.  There is osteopenia.  No large knee joint effusion.  No acute displaced fracture or dislocation of the right knee.     Left knee:     There are degenerative changes of the knee.  No acute displaced fracture or dislocation of the knee.  No large knee joint effusion.    4. IMPRESSION:  1. No acute displaced fracture or  dislocation of the left or right knee noting degenerative changes.     IMAGIN. Knee X-ray ordered due to bilateral knee pain. 2 views taken today.   2. X-ray images were reviewed personally by me and then directly with patient.  3. FINDINGS:  Acute fracture or dislocation, advanced degenerative changes, soft tissues unremarkable.    4. IMPRESSION:  Kellgren Louie grade 3 osteoarthritic changes.  No acute osseous abnormalities appreciated.    ASSESSMENT:      ICD-10-CM ICD-9-CM   1. Primary osteoarthritis of both knees  M17.0 715.16   2. Bilateral chronic knee pain  M25.561 719.46    M25.562 338.29    G89.29          PLAN:  Lengthy discussion was had with patient today regarding the various treatment options for osteoarthritis of the knee, which is thought to be the primary pain generator for the patient.  These options included:  - corticosteroid injection with triamcinolone  - hyaluronic acid injections  - long-acting corticosteroid injection with Zilretta  - platelet rich plasma  - Iovera  - genicular nerve ablation  - definitive treatment with total knee arthroplasty    We will move forward with bilateral corticosteroid injections under ultrasound guidance at today's visit.    All questions were answered to the best of my ability and all concerns were addressed at this time.    Follow up in 6 weeks, or sooner if needed.      This note is dictated using the M*Modal Fluency Direct word recognition program. There are word recognition mistakes that are occasionally missed on review.

## 2024-08-01 NOTE — PROCEDURES
Large Joint Aspiration/Injection: bilateral knee    Date/Time: 8/1/2024 11:00 AM    Performed by: Carol Jones MD  Authorized by: Carol Jones MD    Consent Done?:  Yes (Verbal)  Indications:  Arthritis and pain  Site marked: the procedure site was marked    Timeout: prior to procedure the correct patient, procedure, and site was verified    Prep: patient was prepped and draped in usual sterile fashion      Local anesthesia used?: Yes    Anesthesia:  Local infiltration  Local anesthetic:  Co-phenylcaine spray    Details:  Needle Size:  22 G  Ultrasonic Guidance for needle placement?: Yes (Ultrasound guidance used to avoid neurovascular injury and/or to improve accuracy given body habitus.)    Images are saved and documented.  Approach: Superolateral.  Location:  Knee  Laterality:  Bilateral  Site:  Bilateral knee  Medications (Right):  40 mg triamcinolone acetonide 40 mg/mL  Medications (Right) comment:  2mL Ropivacaine 0.2%    Medications (Left):  40 mg triamcinolone acetonide 40 mg/mL  Medications (Left) comment:  2mL Ropivacaine 0.2%    Patient tolerance:  Patient tolerated the procedure well with no immediate complications     TECHNIQUE: Real time ultrasound examinations of the bilateral knees were performed with [employer] 2, 9-L MHz linear probe(s). Ultrasound guidance was used for needle localization. Images were saved and stored for documentation. Dynamic visualization of the needle was continuous throughout the procedures and maintained in good position.

## 2024-09-18 NOTE — PROGRESS NOTES
Telemedicine/Virtual Visit Documentation:     The patient location is: home    The chief complaint leading to consultation is: see HPI    VISIT TYPE X   Virtual visit with synchronous audio and video X   Telephone E/M service      Total time spent with patient: see X nathan on chart below.   More than half of the time was spent counseling or coordinating care including prognosis, differential diagnosis, risks and benefits of treatment, instructions, compliance risk reductions     EST MINUTES X   79403 5    61328 10    45526 15    85362 25 X   99215 40    NEW     49236 10    71600 20    04186 30    28462 45    65059 60    PHONE      5-10    60154 11-20    04117 21-30      H&P  Orthopaedics      SUBJECTIVE:     History of Present Illness:  Patient is a 59 y.o. female with bilateral knee pain following up after CSI.  Patient had a solid 2-3 weeks of complete pain relief, but pain started to creep back up.  She rates pain in her right knee now a 4/10, which was a 6/10 prior to injection.  She rates her left knee at 1/10 which was previously a 2/10 prior to injection.  Patient is not satisfied with the pain relief.      Review of patient's allergies indicates:  No Known Allergies    No past medical history on file.  No past surgical history on file.  No family history on file.        Review of Systems:  Patient denies constitutional symptoms, cardiac symptoms, respiratory symptoms, GI symptoms.  The remainder of the musculoskeletal ROS is included in the HPI.      OBJECTIVE:     Physical Exam:  Physical exam limited as this was a virtual visit, but it is apparent that the individual is in no acute distress, well groomed, well kept.  Speech is normal.  Patient is alert and oriented x3.  Mood and affect appear normal.       ASSESSMENT/PLAN:     A/P: Erin Johnson is a 59 y.o. patient with Kellgren-Louie Grade 2 or higher osteoarthritic changes to the knee(s) (please see previous note with description of xray  images).  Prior to hyaluronic injections, patient had functional limitations with decreased range of motion and persistent pain.  Patient has tried aerobic and resistance training and weight reduction without improvement of symptoms.  Patient failed to improve with the use of NSAIDs and Acetaminophen over the last 12 months.  Patient did not receive more than 8 weeks of relief from corticosteroid injection.      We will move forward with Durolane to the bilateral knee(s).

## 2024-09-19 ENCOUNTER — TELEPHONE (OUTPATIENT)
Dept: SPORTS MEDICINE | Facility: CLINIC | Age: 59
End: 2024-09-19

## 2024-09-19 ENCOUNTER — OFFICE VISIT (OUTPATIENT)
Dept: SPORTS MEDICINE | Facility: CLINIC | Age: 59
End: 2024-09-19
Payer: COMMERCIAL

## 2024-09-19 DIAGNOSIS — G89.29 BILATERAL CHRONIC KNEE PAIN: Primary | ICD-10-CM

## 2024-09-19 DIAGNOSIS — M17.0 PRIMARY OSTEOARTHRITIS OF BOTH KNEES: ICD-10-CM

## 2024-09-19 DIAGNOSIS — M25.562 BILATERAL CHRONIC KNEE PAIN: Primary | ICD-10-CM

## 2024-09-19 DIAGNOSIS — M25.561 BILATERAL CHRONIC KNEE PAIN: Primary | ICD-10-CM

## 2024-09-19 PROCEDURE — 1160F RVW MEDS BY RX/DR IN RCRD: CPT | Mod: CPTII,95,, | Performed by: STUDENT IN AN ORGANIZED HEALTH CARE EDUCATION/TRAINING PROGRAM

## 2024-09-19 PROCEDURE — 1159F MED LIST DOCD IN RCRD: CPT | Mod: CPTII,95,, | Performed by: STUDENT IN AN ORGANIZED HEALTH CARE EDUCATION/TRAINING PROGRAM

## 2024-09-19 PROCEDURE — 1125F AMNT PAIN NOTED PAIN PRSNT: CPT | Mod: CPTII,95,, | Performed by: STUDENT IN AN ORGANIZED HEALTH CARE EDUCATION/TRAINING PROGRAM

## 2024-09-19 PROCEDURE — 99214 OFFICE O/P EST MOD 30 MIN: CPT | Mod: 95,,, | Performed by: STUDENT IN AN ORGANIZED HEALTH CARE EDUCATION/TRAINING PROGRAM

## 2024-09-19 PROCEDURE — 3044F HG A1C LEVEL LT 7.0%: CPT | Mod: CPTII,95,, | Performed by: STUDENT IN AN ORGANIZED HEALTH CARE EDUCATION/TRAINING PROGRAM

## 2024-09-19 NOTE — Clinical Note
Please get patient scheduled for Durolane as soon as possible.  Her insurance we will switch in November.

## 2024-10-03 ENCOUNTER — E-VISIT (OUTPATIENT)
Dept: INFECTIOUS DISEASES | Facility: CLINIC | Age: 59
End: 2024-10-03
Payer: COMMERCIAL

## 2024-10-03 ENCOUNTER — TELEPHONE (OUTPATIENT)
Dept: INFECTIOUS DISEASES | Facility: CLINIC | Age: 59
End: 2024-10-03
Payer: COMMERCIAL

## 2024-10-03 DIAGNOSIS — M79.646 PAIN OF FINGER, UNSPECIFIED LATERALITY: Primary | ICD-10-CM

## 2024-10-03 NOTE — TELEPHONE ENCOUNTER
Notify patient that an e-visit was ordered.  She should fill it out with as much detail as possible.  She can also take a picture of the finger and attach it to the e-visit.,

## 2024-10-03 NOTE — TELEPHONE ENCOUNTER
----- Message from Med Assistant Elizabeth sent at 10/2/2024  1:47 PM CDT -----  Pt is stating her right pinky still has swelling and painful. Pt states she is out of town but she would like an e-consult to give you more detail about her finger.  ----- Message -----  From: Janie Bullock  Sent: 2024   2:22 PM CDT  To: Elizabeth Morse MA    FW: Appointment Request  Judith Montgomery MA  Sent:   2:19 PM  To: Elizabeth Morse MA Lynn Alex  MRN: [id number] : 1965  Pt Home: [phone]    Entered: [phone]      Message      ----- Message -----  From: Erin Johnson  Sent: 2024   2:16 PM CDT  To: #  Subject: Appointment Request                              Appointment Request From: Erin Johnson    With Provider: Yefri Avilez MD [[facility] - Infectious Disease ]    Preferred Date Range: 10/11/2024 - 2024    Preferred Times: Any Time    Reason for visit: Office Visit    Comments:  I have some very weird swelling in my pinky that is not getting better-painful, red, and swollen    Primary Coverage(Effective 2023)    Payer Plan Group Number Group Name Effective From Effective To  [facility] [employer] [id number]  2023     Patient Demographics    Address  [address] [place] [address] Phone  [phone] (Home) #Preferred#  [phone] (Mobile) E-mail Address  [email or web address]    # of No Show in Current Department:0    Future Appointments  2024 - 2025   Date Visit Type Department Provider   10/29/2024  8:00 AM ESTABLISHED PATIENT EXTENDED [facility] - Sports Medicine Carol Jones MD         2024 10:00 AM NEW PATIENT - DERM ([facility]) [facility] - Dermatology  Kalyan Martinez MD

## 2024-10-04 ENCOUNTER — PATIENT MESSAGE (OUTPATIENT)
Dept: INFECTIOUS DISEASES | Facility: CLINIC | Age: 59
End: 2024-10-04
Payer: COMMERCIAL

## 2024-10-04 ENCOUNTER — TELEPHONE (OUTPATIENT)
Dept: INFECTIOUS DISEASES | Facility: CLINIC | Age: 59
End: 2024-10-04
Payer: COMMERCIAL

## 2024-10-04 RX ORDER — IBUPROFEN 800 MG/1
800 TABLET ORAL 3 TIMES DAILY PRN
Qty: 30 TABLET | Refills: 0 | Status: SHIPPED | OUTPATIENT
Start: 2024-10-04

## 2024-10-04 NOTE — TELEPHONE ENCOUNTER
----- Message from Bridger Garcia sent at 10/3/2024  5:13 PM CDT -----  Notify patient that an e-visit was ordered.  She should fill it out with as much detail as possible.  She can also take a picture of the finger and attach it to the e-visit.,

## 2024-10-04 NOTE — PROGRESS NOTES
59 year old female with a history of HIV who is reporting pain and swelling of her right 5th digit.  Symptoms have been present for 1 month.  Patient has difficulty bending her finger.    Assessment  Right 5th finger pain and swelling:  Differential includes trauma vs gout vs gonococcal infection of digit.    Plan  Schedule blood work and urine test.  Schedule x-ray of the finger.  Prescription for ibuprofen prn for pain and swelling. Notify patient that prescription sent.

## 2024-10-09 DIAGNOSIS — B20 HUMAN IMMUNODEFICIENCY VIRUS (HIV) DISEASE: ICD-10-CM

## 2024-10-10 RX ORDER — DOLUTEGRAVIR SODIUM AND LAMIVUDINE 50; 300 MG/1; MG/1
1 TABLET, FILM COATED ORAL DAILY
Qty: 90 TABLET | Refills: 1 | Status: SHIPPED | OUTPATIENT
Start: 2024-10-10 | End: 2025-04-08

## 2024-10-14 ENCOUNTER — HOSPITAL ENCOUNTER (OUTPATIENT)
Dept: RADIOLOGY | Facility: HOSPITAL | Age: 59
Discharge: HOME | End: 2024-10-14
Attending: INTERNAL MEDICINE
Payer: COMMERCIAL

## 2024-10-14 DIAGNOSIS — M79.646 PAIN OF FINGER, UNSPECIFIED LATERALITY: ICD-10-CM

## 2024-10-14 PROCEDURE — 73140 X-RAY EXAM OF FINGER(S): CPT | Mod: TC,RT

## 2024-10-14 PROCEDURE — 73140 X-RAY EXAM OF FINGER(S): CPT | Mod: 26,RT,, | Performed by: RADIOLOGY

## 2024-10-16 ENCOUNTER — TELEPHONE (OUTPATIENT)
Dept: INFECTIOUS DISEASES | Facility: CLINIC | Age: 59
End: 2024-10-16
Payer: COMMERCIAL

## 2024-10-24 ENCOUNTER — HOSPITAL ENCOUNTER (OUTPATIENT)
Dept: RADIOLOGY | Facility: OTHER | Age: 59
Discharge: HOME | End: 2024-10-24
Attending: INTERNAL MEDICINE
Payer: COMMERCIAL

## 2024-10-24 DIAGNOSIS — Z00.00 HEALTH MAINTENANCE EXAMINATION: ICD-10-CM

## 2024-10-24 DIAGNOSIS — Z12.31 VISIT FOR SCREENING MAMMOGRAM: ICD-10-CM

## 2024-10-24 PROCEDURE — 77063 BREAST TOMOSYNTHESIS BI: CPT | Mod: TC

## 2024-10-28 ENCOUNTER — OFFICE VISIT (OUTPATIENT)
Dept: INFECTIOUS DISEASES | Facility: CLINIC | Age: 59
End: 2024-10-28
Payer: COMMERCIAL

## 2024-10-28 ENCOUNTER — CLINICAL SUPPORT (OUTPATIENT)
Dept: INFECTIOUS DISEASES | Facility: CLINIC | Age: 59
End: 2024-10-28
Payer: COMMERCIAL

## 2024-10-28 VITALS
HEART RATE: 80 BPM | SYSTOLIC BLOOD PRESSURE: 126 MMHG | DIASTOLIC BLOOD PRESSURE: 85 MMHG | TEMPERATURE: 99 F | WEIGHT: 191.81 LBS | BODY MASS INDEX: 30.82 KG/M2 | HEIGHT: 66 IN

## 2024-10-28 DIAGNOSIS — Z80.0 FAMILY HISTORY OF COLON CANCER: ICD-10-CM

## 2024-10-28 DIAGNOSIS — Z23 IMMUNIZATION DUE: ICD-10-CM

## 2024-10-28 DIAGNOSIS — B20 HUMAN IMMUNODEFICIENCY VIRUS (HIV) DISEASE: Primary | ICD-10-CM

## 2024-10-28 DIAGNOSIS — M65.90 TENOSYNOVITIS: ICD-10-CM

## 2024-10-28 DIAGNOSIS — Z12.4 PAP SMEAR FOR CERVICAL CANCER SCREENING: ICD-10-CM

## 2024-10-28 DIAGNOSIS — M79.641 PAIN OF RIGHT HAND: ICD-10-CM

## 2024-10-28 PROCEDURE — 90480 ADMN SARSCOV2 VAC 1/ONLY CMP: CPT | Mod: S$GLB,,, | Performed by: INTERNAL MEDICINE

## 2024-10-28 PROCEDURE — 99215 OFFICE O/P EST HI 40 MIN: CPT | Mod: S$GLB,,, | Performed by: INTERNAL MEDICINE

## 2024-10-28 PROCEDURE — 1159F MED LIST DOCD IN RCRD: CPT | Mod: CPTII,S$GLB,, | Performed by: INTERNAL MEDICINE

## 2024-10-28 PROCEDURE — 99999 PR PBB SHADOW E&M-EST. PATIENT-LVL I: CPT | Mod: PBBFAC,,,

## 2024-10-28 PROCEDURE — 3079F DIAST BP 80-89 MM HG: CPT | Mod: CPTII,S$GLB,, | Performed by: INTERNAL MEDICINE

## 2024-10-28 PROCEDURE — 1160F RVW MEDS BY RX/DR IN RCRD: CPT | Mod: CPTII,S$GLB,, | Performed by: INTERNAL MEDICINE

## 2024-10-28 PROCEDURE — 91320 SARSCV2 VAC 30MCG TRS-SUC IM: CPT | Mod: S$GLB,,, | Performed by: INTERNAL MEDICINE

## 2024-10-28 PROCEDURE — 3008F BODY MASS INDEX DOCD: CPT | Mod: CPTII,S$GLB,, | Performed by: INTERNAL MEDICINE

## 2024-10-28 PROCEDURE — 3074F SYST BP LT 130 MM HG: CPT | Mod: CPTII,S$GLB,, | Performed by: INTERNAL MEDICINE

## 2024-10-28 PROCEDURE — 99999 PR PBB SHADOW E&M-EST. PATIENT-LVL IV: CPT | Mod: PBBFAC,,, | Performed by: INTERNAL MEDICINE

## 2024-10-28 PROCEDURE — 3044F HG A1C LEVEL LT 7.0%: CPT | Mod: CPTII,S$GLB,, | Performed by: INTERNAL MEDICINE

## 2024-10-28 NOTE — PROGRESS NOTES
Subjective     Patient ID: Erin Johnson is a 59 y.o. female.    Chief Complaint:Follow-up      History of Present Illness    59 year with HIV diagnosed in .  In  contracted PJP while pregnant.  Hospitalized, intubated.  Started taking medications after her discharge.  She was started HAART.  She had shingles while her immune system was improving.  She is now currently on Dovato.  She has been tolerating Dovato very well.     Interval history  - sister had an accident with a ladder. Brother inlaw had surgery  - drove to [place] from [facility] and then to [place].    - right 5th digit is inflamed.  Ongoing for several weeks.      Medical History  HIV diagnosed in .  PJP pneumonia  Arthritis of the knees  Has a bad reaction to covid shots (manifests as headaches).    Colonoscopy was done in  or .  Pap smear in  or ..     Surgical History  Right Knee surgery      Medications  Dovato     Family History  Father  of colon cancer at age 60  Father had type 2 diabetes  Glaucoma  Skin cancer  Mother is 87 years of age     Social History  Occasionally smokes tobacco.  Occasionally drinks ETOH.  No illicit.  Living in [place] X 5 months.  Originally from [place].  Born and raised in [place].  She has lived in [place], [place] and [place].  She has a 19 year old son. Speaks [place].  No pets at home.  Lives alone.  Son lives in [place].  Worked in public health as a  (for 12 years).  Graduated from [employer] with a degree in social work.      Review of Systems   All other systems reviewed and are negative.       Objective   Physical Exam  Vitals and nursing note reviewed.   Constitutional:       General: She is not in acute distress.     Appearance: She is well-developed. She is not diaphoretic.   HENT:      Head: Normocephalic and atraumatic.      Right Ear: External ear normal.      Left Ear: External ear normal.      Nose: Nose normal.       Mouth/Throat:      Pharynx: No oropharyngeal exudate.   Eyes:      General: No scleral icterus.        Right eye: No discharge.         Left eye: No discharge.      Conjunctiva/sclera: Conjunctivae normal.      Pupils: Pupils are equal, round, and reactive to light.   Neck:      Thyroid: No thyromegaly.      Vascular: No JVD.      Trachea: No tracheal deviation.   Cardiovascular:      Rate and Rhythm: Normal rate and regular rhythm.      Heart sounds: No murmur heard.     No friction rub. No gallop.   Pulmonary:      Effort: Pulmonary effort is normal. No respiratory distress.      Breath sounds: Normal breath sounds. No stridor. No wheezing or rales.   Chest:      Chest wall: No tenderness.   Abdominal:      General: Bowel sounds are normal. There is no distension.      Palpations: Abdomen is soft. There is no mass.      Tenderness: There is no abdominal tenderness. There is no guarding or rebound.   Musculoskeletal:         General: Swelling (right 5th digit joint) and tenderness (right 4th digit joint) present.      Cervical back: Normal range of motion and neck supple.   Lymphadenopathy:      Cervical: No cervical adenopathy.   Skin:     General: Skin is warm.      Coloration: Skin is not pale.      Findings: No erythema or rash.   Neurological:      Mental Status: She is alert and oriented to person, place, and time.      Cranial Nerves: No cranial nerve deficit.      Motor: No abnormal muscle tone.      Coordination: Coordination normal.      Deep Tendon Reflexes: Reflexes normal.   Psychiatric:         Behavior: Behavior normal.         Thought Content: Thought content normal.         Judgment: Judgment normal.            Assessment and Plan     1. Human immunodeficiency virus (HIV) disease :  Stable.  Continue on dovato.   2. Pain of right hand :  Will check MRI of finger.   3. Tenosynovitis :  Will check MRI of finger.   4. Family history of colon cancer :  Will refer for colonoscopy.   5. Pap smear  for cervical cancer screening :  Will refer for PAP smear.   6. Immunization due :  COVID vaccine ordered.       Plan:  Human immunodeficiency virus (HIV) disease    Pain of right hand  -     MRI Hand Fingers Without Contrast Right; Future; Expected date: 10/28/2024    Tenosynovitis  -     MRI Hand Fingers Without Contrast Right; Future; Expected date: 10/28/2024    Family history of colon cancer  -     Ambulatory referral/consult to Endo Procedure ; Future; Expected date: 10/29/2024    Pap smear for cervical cancer screening  -     Ambulatory referral/consult to Obstetrics / Gynecology; Future; Expected date: 11/04/2024    Immunization due  -     COVID-19 (Pfizer) 30 mcg/0.3 mL IM vaccine (>/= 11 yo) 0.3 mL

## 2024-10-29 ENCOUNTER — OFFICE VISIT (OUTPATIENT)
Dept: SPORTS MEDICINE | Facility: CLINIC | Age: 59
End: 2024-10-29
Payer: COMMERCIAL

## 2024-10-29 VITALS
HEIGHT: 66 IN | DIASTOLIC BLOOD PRESSURE: 82 MMHG | BODY MASS INDEX: 30.82 KG/M2 | SYSTOLIC BLOOD PRESSURE: 125 MMHG | WEIGHT: 191.81 LBS

## 2024-10-29 DIAGNOSIS — M25.561 BILATERAL CHRONIC KNEE PAIN: Primary | ICD-10-CM

## 2024-10-29 DIAGNOSIS — G89.29 BILATERAL CHRONIC KNEE PAIN: Primary | ICD-10-CM

## 2024-10-29 DIAGNOSIS — M25.562 BILATERAL CHRONIC KNEE PAIN: Primary | ICD-10-CM

## 2024-10-29 DIAGNOSIS — M17.0 PRIMARY OSTEOARTHRITIS OF BOTH KNEES: ICD-10-CM

## 2024-10-29 PROCEDURE — 20611 DRAIN/INJ JOINT/BURSA W/US: CPT | Mod: 50,S$GLB,, | Performed by: STUDENT IN AN ORGANIZED HEALTH CARE EDUCATION/TRAINING PROGRAM

## 2024-10-29 PROCEDURE — 99999 PR PBB SHADOW E&M-EST. PATIENT-LVL III: CPT | Mod: PBBFAC,,, | Performed by: STUDENT IN AN ORGANIZED HEALTH CARE EDUCATION/TRAINING PROGRAM

## 2024-10-29 PROCEDURE — 99499 UNLISTED E&M SERVICE: CPT | Mod: S$GLB,,, | Performed by: STUDENT IN AN ORGANIZED HEALTH CARE EDUCATION/TRAINING PROGRAM

## 2024-11-06 ENCOUNTER — OFFICE VISIT (OUTPATIENT)
Dept: OBSTETRICS AND GYNECOLOGY | Facility: CLINIC | Age: 59
End: 2024-11-06
Attending: INTERNAL MEDICINE
Payer: COMMERCIAL

## 2024-11-06 VITALS
SYSTOLIC BLOOD PRESSURE: 120 MMHG | BODY MASS INDEX: 31.25 KG/M2 | HEIGHT: 66 IN | WEIGHT: 194.44 LBS | DIASTOLIC BLOOD PRESSURE: 80 MMHG

## 2024-11-06 DIAGNOSIS — Z01.419 WELL WOMAN EXAM WITH ROUTINE GYNECOLOGICAL EXAM: Primary | ICD-10-CM

## 2024-11-06 DIAGNOSIS — Z12.4 PAP SMEAR FOR CERVICAL CANCER SCREENING: ICD-10-CM

## 2024-11-06 PROCEDURE — 99999 PR PBB SHADOW E&M-EST. PATIENT-LVL III: CPT | Mod: PBBFAC,,, | Performed by: STUDENT IN AN ORGANIZED HEALTH CARE EDUCATION/TRAINING PROGRAM

## 2024-11-06 NOTE — PROGRESS NOTES
History & Physical  Gynecology      SUBJECTIVE:     Chief Complaint: Well Woman       History of Present Illness:  Annual Exam-Postmenopausal  Patient presents for annual exam. She has no complaints today. Menopause at age 52. Vasomotor symptoms are manageable. Patient denies post-menopausal vaginal bleeding.   She is not currently sexually active. She has never taken hormone replacement therapy.  She participates in regular exercise: yes - swimming.  She smokes occasionally.     GYN screening history: last pap: approximate date  and was normal  Mammogram history: done 10/24  Colonoscopy history: upcoming in   Dexa history: not yet done    FH:   Breast cancer: denies  Colon cancer: father with history of colon cancer, diagnosed in late 50s  Ovarian cancer: denies    Review of patient's allergies indicates:  No Known Allergies    History reviewed. No pertinent past medical history.  History reviewed. No pertinent surgical history.  OB History          1    Para   1    Term   1            AB        Living             SAB        IAB        Ectopic        Multiple        Live Births                   No family history on file.  Social History     Tobacco Use    Smoking status: Former     Types: Cigarettes    Smokeless tobacco: Never   Substance Use Topics    Alcohol use: Yes    Drug use: Never       Current Outpatient Medications   Medication Sig    DOVATO  mg Tab TAKE 1 TABLET BY MOUTH DAILY    ibuprofen (ADVIL,MOTRIN) 800 MG tablet Take 1 tablet (800 mg total) by mouth 3 (three) times daily as needed for Pain.    pravastatin (PRAVACHOL) 10 MG tablet Take 1 tablet (10 mg total) by mouth once daily.    triazolam 0.25 MG tablet Take by mouth.     No current facility-administered medications for this visit.       Review of Systems:  Review of Systems   Constitutional:  Negative for chills and fever.   Respiratory:  Negative for shortness of breath.    Cardiovascular:  Negative for chest  pain.   Gastrointestinal:  Negative for abdominal pain, nausea and vomiting.   Endocrine: Negative for hot flashes.   Genitourinary:  Negative for menstrual problem.   Integumentary:  Negative for breast mass, nipple discharge and breast skin changes.   Neurological:  Negative for headaches.   Hematological:  Does not bruise/bleed easily.   Psychiatric/Behavioral:  Negative for depression.    Breast: Negative for mass, mastodynia, nipple discharge and skin changes       OBJECTIVE:     Physical Exam:  Physical Exam  Constitutional:       Appearance: Normal appearance.   HENT:      Head: Normocephalic and atraumatic.   Eyes:      Conjunctiva/sclera: Conjunctivae normal.      Pupils: Pupils are equal, round, and reactive to light.   Cardiovascular:      Rate and Rhythm: Normal rate and regular rhythm.   Pulmonary:      Effort: Pulmonary effort is normal. No respiratory distress.   Chest:   Breasts:     Right: No inverted nipple, mass, nipple discharge, skin change or tenderness.      Left: No inverted nipple, mass, nipple discharge, skin change or tenderness.   Abdominal:      General: Abdomen is flat. There is no distension.      Palpations: Abdomen is soft.      Tenderness: There is no abdominal tenderness.   Genitourinary:     General: Normal vulva.      Vagina: No vaginal discharge, tenderness or bleeding.      Cervix: No cervical motion tenderness or friability.      Uterus: Not enlarged and not tender.       Adnexa:         Right: No mass, tenderness or fullness.          Left: No mass, tenderness or fullness.     Musculoskeletal:         General: Normal range of motion.      Cervical back: Normal range of motion.   Neurological:      Mental Status: She is alert.   Psychiatric:         Mood and Affect: Mood normal.         Thought Content: Thought content normal.         ASSESSMENT:       ICD-10-CM ICD-9-CM    1. Well woman exam with routine gynecological exam  Z01.419 V72.31 Liquid-Based Pap Smear, Screening       HPV High Risk Genotypes, PCR      DXA Bone Density Appendicular Skeleton      2. Pap smear for cervical cancer screening  Z12.4 V76.2 Ambulatory referral/consult to Obstetrics / Gynecology             Plan:      Erin was seen today for well woman.    Diagnoses and all orders for this visit:    Well woman exam with routine gynecological exam  -     Liquid-Based Pap Smear, Screening  -     HPV High Risk Genotypes, PCR  -     DXA Bone Density Appendicular Skeleton; Future    Pap smear for cervical cancer screening  -     Ambulatory referral/consult to Obstetrics / Gynecology        Orders Placed This Encounter   Procedures    HPV High Risk Genotypes, PCR    DXA Bone Density Appendicular Skeleton       Well Woman:   - Pap smear: collected  - Mammogram: awaiting final read  - Colonoscopy: plan to repeat in Jan/Feb  - Dexa: plan to early screening due to tobacco use and h/o HIV  - Immunizations: per PCP  - Labs: per PCP  - Exercise recommended  - Tobacco Cessation counseling provided    Follow up in one year for annual, or prn.    Pop Perez

## 2024-11-07 ENCOUNTER — HOSPITAL ENCOUNTER (OUTPATIENT)
Dept: RADIOLOGY | Facility: OTHER | Age: 59
Discharge: HOME | End: 2024-11-07
Attending: STUDENT IN AN ORGANIZED HEALTH CARE EDUCATION/TRAINING PROGRAM
Payer: COMMERCIAL

## 2024-11-07 DIAGNOSIS — Z01.419 WELL WOMAN EXAM WITH ROUTINE GYNECOLOGICAL EXAM: ICD-10-CM

## 2024-11-07 PROCEDURE — 77081 DXA BONE DENSITY APPENDICULR: CPT | Mod: TC

## 2024-11-18 DIAGNOSIS — E78.2 MIXED HYPERLIPIDEMIA: ICD-10-CM

## 2024-11-18 RX ORDER — PRAVASTATIN SODIUM 10 MG/1
10 TABLET ORAL DAILY
Qty: 90 TABLET | Refills: 3 | Status: SHIPPED | OUTPATIENT
Start: 2024-11-18 | End: 2025-11-18

## 2024-12-02 ENCOUNTER — PATIENT MESSAGE (OUTPATIENT)
Dept: INFECTIOUS DISEASES | Facility: CLINIC | Age: 59
End: 2024-12-02
Payer: COMMERCIAL

## 2024-12-02 ENCOUNTER — TELEPHONE (OUTPATIENT)
Dept: INFECTIOUS DISEASES | Facility: CLINIC | Age: 59
End: 2024-12-02
Payer: COMMERCIAL

## 2024-12-02 DIAGNOSIS — M79.641 PAIN OF RIGHT HAND: Primary | ICD-10-CM

## 2024-12-03 ENCOUNTER — TELEPHONE (OUTPATIENT)
Dept: ORTHOPEDICS | Facility: CLINIC | Age: 59
End: 2024-12-03
Payer: COMMERCIAL

## 2024-12-03 DIAGNOSIS — M79.642 BILATERAL HAND PAIN: Primary | ICD-10-CM

## 2024-12-03 DIAGNOSIS — M79.641 BILATERAL HAND PAIN: Primary | ICD-10-CM

## 2024-12-03 NOTE — TELEPHONE ENCOUNTER
Patient communication     Notified patient to stop at [facility] Location - 1st Floor [address] [facility], Please park in [place] and use [place] elevators 30 minutes prior to your appointment time for X-ray. After your X-ray please proceed to 9th Floor [address] for Appointment on 12/11/24 with Temi Felix PA-C for x-rays.     Made them aware that this is not a scheduled xray appointment and they might be running behind as they are considered a walk-in xray.    Verbalized the Following:  *Please arrive at your informed time above, if you are more than 15 Minutes late to your appointment with Temi Felix PA-C we will have to reschedule your appointment. This will allow you to be seen in a timely manor and be conscious to other patients being seen that same day*

## 2024-12-06 ENCOUNTER — TELEPHONE (OUTPATIENT)
Dept: INFECTIOUS DISEASES | Facility: CLINIC | Age: 59
End: 2024-12-06
Payer: COMMERCIAL

## 2024-12-06 DIAGNOSIS — B20 HUMAN IMMUNODEFICIENCY VIRUS (HIV) DISEASE: ICD-10-CM

## 2024-12-06 DIAGNOSIS — E78.2 MIXED HYPERLIPIDEMIA: ICD-10-CM

## 2024-12-06 RX ORDER — DOLUTEGRAVIR SODIUM AND LAMIVUDINE 50; 300 MG/1; MG/1
1 TABLET, FILM COATED ORAL DAILY
Qty: 90 TABLET | Refills: 1 | Status: SHIPPED | OUTPATIENT
Start: 2024-12-06 | End: 2025-06-04

## 2024-12-06 RX ORDER — PRAVASTATIN SODIUM 10 MG/1
10 TABLET ORAL DAILY
Qty: 90 TABLET | Refills: 3 | Status: SHIPPED | OUTPATIENT
Start: 2024-12-06 | End: 2025-12-06

## 2024-12-09 NOTE — PROGRESS NOTES
Telemedicine/Virtual Visit Documentation:     The patient location is: home    The chief complaint leading to consultation is: see HPI    VISIT TYPE X   Virtual visit with synchronous audio and video X   Telephone E/M service      Total time spent with patient: see X nathan on chart below.   More than half of the time was spent counseling or coordinating care including prognosis, differential diagnosis, risks and benefits of treatment, instructions, compliance risk reductions     EST MINUTES X   83643 5    16728 10    86391 15    82591 25 X   99215 40    NEW     54308 10    96186 20    39813 30    64286 45    [id number] 60    PHONE      5-10    14218 11-20    28571 21-30      H&P  Orthopaedics      SUBJECTIVE:     History of Present Illness:  Patient is a 59 y.o. female with bilateral knee pain following up after Durolane injections.  Patient feels that she is about 75% functional compared to 20-25% prior to injections.  Pain is much improved.    Review of patient's allergies indicates:  No Known Allergies    No past medical history on file.  No past surgical history on file.  No family history on file.  Social History     Tobacco Use    Smoking status: Former     Types: Cigarettes    Smokeless tobacco: Never   Substance Use Topics    Alcohol use: Yes    Drug use: Never        Review of Systems:  Patient denies constitutional symptoms, cardiac symptoms, respiratory symptoms, GI symptoms.  The remainder of the musculoskeletal ROS is included in the HPI.      OBJECTIVE:     Physical Exam:  Physical exam limited as this was a virtual visit, but it is apparent that the individual is in no acute distress, well groomed, well kept.  Speech is normal.  Patient is alert and oriented x3.  Mood and affect appear normal.       ASSESSMENT/PLAN:     A/P: Erin Johnson is a 59 y.o. Patient with great results following Durolane injection to the bilateral knee(s).  Plan is to repeat Durolane on or after 4/29/25.

## 2024-12-10 ENCOUNTER — TELEPHONE (OUTPATIENT)
Dept: SPORTS MEDICINE | Facility: CLINIC | Age: 59
End: 2024-12-10

## 2024-12-10 ENCOUNTER — OFFICE VISIT (OUTPATIENT)
Dept: SPORTS MEDICINE | Facility: CLINIC | Age: 59
End: 2024-12-10
Payer: COMMERCIAL

## 2024-12-10 DIAGNOSIS — G89.29 BILATERAL CHRONIC KNEE PAIN: Primary | ICD-10-CM

## 2024-12-10 DIAGNOSIS — M17.0 PRIMARY OSTEOARTHRITIS OF BOTH KNEES: ICD-10-CM

## 2024-12-10 DIAGNOSIS — M25.562 BILATERAL CHRONIC KNEE PAIN: Primary | ICD-10-CM

## 2024-12-10 DIAGNOSIS — M25.561 BILATERAL CHRONIC KNEE PAIN: Primary | ICD-10-CM

## 2024-12-10 PROCEDURE — 3044F HG A1C LEVEL LT 7.0%: CPT | Mod: CPTII,95,, | Performed by: STUDENT IN AN ORGANIZED HEALTH CARE EDUCATION/TRAINING PROGRAM

## 2024-12-10 PROCEDURE — 99214 OFFICE O/P EST MOD 30 MIN: CPT | Mod: 95,,, | Performed by: STUDENT IN AN ORGANIZED HEALTH CARE EDUCATION/TRAINING PROGRAM

## 2024-12-10 PROCEDURE — 1159F MED LIST DOCD IN RCRD: CPT | Mod: CPTII,95,, | Performed by: STUDENT IN AN ORGANIZED HEALTH CARE EDUCATION/TRAINING PROGRAM

## 2024-12-10 PROCEDURE — 1160F RVW MEDS BY RX/DR IN RCRD: CPT | Mod: CPTII,95,, | Performed by: STUDENT IN AN ORGANIZED HEALTH CARE EDUCATION/TRAINING PROGRAM

## 2024-12-10 NOTE — TELEPHONE ENCOUNTER
Called pt, LVM.    Irma Valenzuela MS, OTC  Clinical Assistant to Dr. Carol Jones      ----- Message from Carol Jones MD sent at 12/10/2024  7:31 AM CST -----  Please get patient scheduled for repeat Durolane on or after 04/29/2025.

## 2024-12-11 ENCOUNTER — HOSPITAL ENCOUNTER (OUTPATIENT)
Dept: RADIOLOGY | Facility: OTHER | Age: 59
Discharge: HOME | End: 2024-12-11
Payer: COMMERCIAL

## 2024-12-11 ENCOUNTER — OFFICE VISIT (OUTPATIENT)
Dept: ORTHOPEDICS | Facility: CLINIC | Age: 59
End: 2024-12-11
Payer: COMMERCIAL

## 2024-12-11 VITALS — BODY MASS INDEX: 31.25 KG/M2 | WEIGHT: 194.44 LBS | HEIGHT: 66 IN

## 2024-12-11 DIAGNOSIS — M18.12 LOCALIZED PRIMARY OSTEOARTHRITIS OF CARPOMETACARPAL JOINT OF LEFT THUMB: ICD-10-CM

## 2024-12-11 DIAGNOSIS — M79.641 BILATERAL HAND PAIN: ICD-10-CM

## 2024-12-11 DIAGNOSIS — R22.31 MASS OF FINGER OF RIGHT HAND: Primary | ICD-10-CM

## 2024-12-11 DIAGNOSIS — M79.641 RIGHT HAND PAIN: ICD-10-CM

## 2024-12-11 DIAGNOSIS — M79.642 BILATERAL HAND PAIN: ICD-10-CM

## 2024-12-11 PROCEDURE — 73130 X-RAY EXAM OF HAND: CPT | Mod: TC,50,FY

## 2024-12-11 PROCEDURE — 99999 PR PBB SHADOW E&M-EST. PATIENT-LVL III: CPT | Mod: PBBFAC,,,

## 2024-12-11 PROCEDURE — 99204 OFFICE O/P NEW MOD 45 MIN: CPT | Mod: S$GLB,,,

## 2024-12-11 PROCEDURE — 1160F RVW MEDS BY RX/DR IN RCRD: CPT | Mod: CPTII,S$GLB,,

## 2024-12-11 PROCEDURE — 1159F MED LIST DOCD IN RCRD: CPT | Mod: CPTII,S$GLB,,

## 2024-12-11 PROCEDURE — 73130 X-RAY EXAM OF HAND: CPT | Mod: 26,,, | Performed by: INTERNAL MEDICINE

## 2024-12-11 PROCEDURE — 3008F BODY MASS INDEX DOCD: CPT | Mod: CPTII,S$GLB,,

## 2024-12-11 PROCEDURE — 3044F HG A1C LEVEL LT 7.0%: CPT | Mod: CPTII,S$GLB,,

## 2024-12-11 NOTE — PROGRESS NOTES
[facility]  History & Physical  Orthopedics    Subjective:      Chief Complaint: Swelling and Pain of the Right Hand (Noticed swelling 8/20/2024)    Referring Provider: Yefri Avilez MD     History of Present Illness:  Erin Johnson is a 59 y.o. right hand dominant female who presents to clinic today with swelling of right small finger for approx 4 months. Patient noticed the swelling on the dorsal aspect of 5th DIP joint on August 20, 2024.  She reports associated bending at the joint  that was not present before. She reports the swelling fluctuates - it increases at the end of the day and after increased use, causing it to appear bright red and inflamed. She denies any pain of the area, but it can get slightly sore when swelling increases. She denies any falls, insect stings, or trauma to the hand.   While it does not significantly affect daily tasks, it's causing distress due to uncertainty of its cause. Patient takes ibuprofen as needed for tooth condition and knee OA. Additionally patient mentions pain located at the base of the left thumb. Denies any numbness or tingling. The patient denies any fevers, chills, N/V, D/C and presents for evaluation.     WORK STATUS:  - Currently unemployed. Recently obtained a degree in social work.  - Previously worked in public health  - Lots of typing and computer work    ROS:  Constitutional: -weight loss, +fatigue, +dizziness  Head: +headaches  Respiratory: +shortness of breath, +cough  Cardiovascular: -chest pain, -palpitations  Gastrointestinal: -nausea, -vomiting  Musculoskeletal: +joint pain, +joint swelling, -joint stiffness, -muscle pain  Neurological: -numbness, -tingling        Vitals:    12/11/24 0857   PainSc: 0-No pain   PainLoc: Hand       No past medical history on file.  No past surgical history on file.  No family history on file.  Social History     Socioeconomic History    Marital status: Single   Tobacco Use    Smoking status:  Former     Types: Cigarettes    Smokeless tobacco: Never   Substance and Sexual Activity    Alcohol use: Yes    Drug use: Never    Sexual activity: Not Currently     Social Drivers of Health     Financial Resource Strain: Low Risk  (1/3/2024)    Overall Financial Resource Strain (CARDIA)     Difficulty of Paying Living Expenses: Not hard at all   Food Insecurity: No Food Insecurity (1/3/2024)    Hunger Vital Sign     Worried About Running Out of Food in the Last Year: Never true     Ran Out of Food in the Last Year: Never true   Transportation Needs: No Transportation Needs (1/3/2024)    PRAPARE - Transportation     Lack of Transportation (Medical): No     Lack of Transportation (Non-Medical): No   Physical Activity: Insufficiently Active (1/3/2024)    Exercise Vital Sign     Days of Exercise per Week: 3 days     Minutes of Exercise per Session: 40 min   Stress: No Stress Concern Present (1/3/2024)    [facility] - Occupational Stress Questionnaire     Feeling of Stress : Only a little   Housing Stability: Low Risk  (1/3/2024)    Housing Stability Vital Sign     Unable to Pay for Housing in the Last Year: No     Number of Places Lived in the Last Year: 2     Unstable Housing in the Last Year: No       Current Outpatient Medications   Medication Sig Dispense Refill    DOVATO  mg Tab Take 1 tablet by mouth Daily. 90 tablet 1    ibuprofen (ADVIL,MOTRIN) 800 MG tablet Take 1 tablet (800 mg total) by mouth 3 (three) times daily as needed for Pain. 30 tablet 0    pravastatin (PRAVACHOL) 10 MG tablet Take 1 tablet (10 mg total) by mouth once daily. 90 tablet 3    triazolam 0.25 MG tablet Take by mouth.       No current facility-administered medications for this visit.     Review of patient's allergies indicates:  No Known Allergies    ROS  Review of Systems:  As per HPI otherwise noncontributory   Constitutional: no fever or chills  Eyes: no visual changes  ENT: no nasal congestion or  "sore throat  Respiratory: no cough or shortness of breath  Cardiovascular: no chest pain  Gastrointestinal: no nausea or vomiting, tolerating diet  Musculoskeletal: pain, soreness, and swelling      Objective:   Vital Signs (Most Recent):  Vitals:    12/11/24 0857   Weight: 88.2 kg (194 lb 7.1 oz)   Height: 5' 6" (1.676 m)     Body mass index is 31.38 kg/m².    Physical Exam:  Constitutional: The patient appears well-developed and well-nourished. No distress.   Skin: No lesions appreciated  Head: Normocephalic and atraumatic.   Nose: Nose normal.   Ears: No deformities seen  Eyes: Conjunctivae and EOM are normal.   Neck: No tracheal deviation present.   Cardiovascular: Normal rate and intact distal pulses.    Pulmonary/Chest: Effort normal. No respiratory distress.   Abdominal: There is no guarding.   Neurological: The patient is alert.   Psychiatric: The patient has a normal mood and affect.     Bilateral HAND/WRIST EXAMINATION:    Observation/Inspection:  Swelling and erythema of dorsal DIP joint of right small finger with palpable firm mass that is nontender to palpation    Deformity  none  Discoloration  none     Scars   none    Atrophy  None    Range of Motion:  Right small finger DIP active and passive flexion/extion intact. Pain with left thumb motion. ROM bilateral hand, wrist, elbow full and painless.     Special Tests and Maneuvers:  CMC grind    Pos left  Circumduction test   Pos left     Neurovascular Exam:  Digits WWP, brisk CR < 3s throughout  NVI motor/LTS to M/R/U nerves, radial pulse 2+    Diagnostics Review:   Imaging: I independently viewed the patient's imaging as well as the radiology report.    My personal interpretation of X-rays AP, lateral, oblique bilateral hands taken today demonstrate well preserved cartilages spaces with no acute fracture or dislocation on right and Eaton stage II basilar thumb arthritis on left with no acute fracture or dislocation.    Assessment:   59 y.o. yo female " with   Encounter Diagnoses   Name Primary?    Mass of small finger of right hand Yes    Right hand pain     Localized primary osteoarthritis of carpometacarpal joint of left thumb       Plan:   The patient and I had a thorough discussion today. We discussed the working diagnosis as well as several other potential alternative diagnoses. Treatment options were discussed, both conservative and surgical. Conservative treatment options would include things such as activity modifications, workplace modifications, a period of rest, ice/heat, oral vs topical OTC and prescription anti-inflammatory medications, acetaminophen, occupational therapy to include strengthening and range of motion exercises, splinting/bracing, immobilization, corticosteroid injections for temporary relief. Surgical options were discussed as well.     Patient would like to proceed with MRI. Ordered MRI of right small finger without contrast to further assess mass for potential surgical planning. Additionally, patient is complaining of left basilar thumb pain and has evidence of basilar thumb arthritis on xray and physical exam; however, she would not like to discuss treatment options at this time as her main concern is her right finger mass.     Follow up with Dr. Green after MRI for results review    Call with any questions/concerns in the interim    Should the patient's symptoms worsen, persist, or fail to improve they should return for reevaluation.     Temi Felix PA-C  [facility]     This note was generated with the assistance of ambient listening technology. Verbal consent was obtained by the patient and accompanying visitor(s) for the recording of patient appointment to facilitate this note. I attest to having reviewed and edited the generated note for accuracy, though some syntax or spelling errors may persist. Please contact the author of this note for any clarification.

## 2025-01-24 ENCOUNTER — PATIENT MESSAGE (OUTPATIENT)
Dept: SPORTS MEDICINE | Facility: CLINIC | Age: 60
End: 2025-01-24
Payer: COMMERCIAL

## 2025-01-24 ENCOUNTER — CLINICAL SUPPORT (OUTPATIENT)
Dept: ENDOSCOPY | Facility: HOSPITAL | Age: 60
End: 2025-01-24
Attending: INTERNAL MEDICINE
Payer: COMMERCIAL

## 2025-01-24 ENCOUNTER — TELEPHONE (OUTPATIENT)
Dept: ENDOSCOPY | Facility: HOSPITAL | Age: 60
End: 2025-01-24

## 2025-01-24 ENCOUNTER — OFFICE VISIT (OUTPATIENT)
Dept: SPORTS MEDICINE | Facility: CLINIC | Age: 60
End: 2025-01-24
Payer: COMMERCIAL

## 2025-01-24 ENCOUNTER — TELEPHONE (OUTPATIENT)
Dept: SPORTS MEDICINE | Facility: CLINIC | Age: 60
End: 2025-01-24

## 2025-01-24 VITALS — HEIGHT: 66 IN | BODY MASS INDEX: 30.53 KG/M2 | WEIGHT: 190 LBS

## 2025-01-24 DIAGNOSIS — Z12.11 SPECIAL SCREENING FOR MALIGNANT NEOPLASMS, COLON: Primary | ICD-10-CM

## 2025-01-24 DIAGNOSIS — M25.561 CHRONIC PAIN OF RIGHT KNEE: Primary | ICD-10-CM

## 2025-01-24 DIAGNOSIS — M17.11 PRIMARY OSTEOARTHRITIS OF RIGHT KNEE: ICD-10-CM

## 2025-01-24 DIAGNOSIS — G89.29 CHRONIC PAIN OF RIGHT KNEE: Primary | ICD-10-CM

## 2025-01-24 DIAGNOSIS — M25.361 KNEE INSTABILITY, RIGHT: ICD-10-CM

## 2025-01-24 DIAGNOSIS — Z80.0 FAMILY HISTORY OF COLON CANCER: ICD-10-CM

## 2025-01-24 PROCEDURE — 1125F AMNT PAIN NOTED PAIN PRSNT: CPT | Mod: CPTII,95,, | Performed by: STUDENT IN AN ORGANIZED HEALTH CARE EDUCATION/TRAINING PROGRAM

## 2025-01-24 PROCEDURE — 1159F MED LIST DOCD IN RCRD: CPT | Mod: CPTII,95,, | Performed by: STUDENT IN AN ORGANIZED HEALTH CARE EDUCATION/TRAINING PROGRAM

## 2025-01-24 PROCEDURE — 98005 SYNCH AUDIO-VIDEO EST LOW 20: CPT | Mod: 95,,, | Performed by: STUDENT IN AN ORGANIZED HEALTH CARE EDUCATION/TRAINING PROGRAM

## 2025-01-24 PROCEDURE — 1160F RVW MEDS BY RX/DR IN RCRD: CPT | Mod: CPTII,95,, | Performed by: STUDENT IN AN ORGANIZED HEALTH CARE EDUCATION/TRAINING PROGRAM

## 2025-01-24 NOTE — PROGRESS NOTES
Telemedicine/Virtual Visit Documentation:     The patient location is: home    The chief complaint leading to consultation is: see HPI    VISIT TYPE X   Virtual visit with synchronous audio and video X   Telephone E/M service      Total time spent with patient: see X nathan on chart below.   More than half of the time was spent counseling or coordinating care including prognosis, differential diagnosis, risks and benefits of treatment, instructions, compliance risk reductions     EST MINUTES X   60127 5    12823 10    17545 15 X   99214 25    50482 40    NEW     08188 10    13010 20    09118 30    39507 45    06454 60    PHONE      5-10    [id number] 11-20    33461 21-30      H&P  Orthopaedics      SUBJECTIVE:     History of Present Illness:  Patient is a 59 y.o. female with right knee pain following up after Durolane injection performed on 10/29/24. Pt reports pain returned in late December 2024.  Patient states that now pain is a 10/10 and she is having recurrent instability.  She is ready to move forward with a surgical consultation.    Review of patient's allergies indicates:  No Known Allergies    No past medical history on file.  No past surgical history on file.  No family history on file.  Social History     Tobacco Use    Smoking status: Former     Types: Cigarettes    Smokeless tobacco: Never   Substance Use Topics    Alcohol use: Yes    Drug use: Never        Review of Systems:  Patient denies constitutional symptoms, cardiac symptoms, respiratory symptoms, GI symptoms.  The remainder of the musculoskeletal ROS is included in the HPI.      OBJECTIVE:     Physical Exam:  Physical exam limited as this was a virtual visit, but it is apparent that the individual is in no acute distress, well groomed, well kept.  Speech is normal.  Patient is alert and oriented x3.  Mood and affect appear normal.       ASSESSMENT/PLAN:     A/P: Erin Johnson is a 59 y.o. with chronic right knee pain secondary to Kellgren  Louie grade 4 osteoarthritic changes to the right knee.  Patient is ready for surgical consultation, thus she will be referred to orthopedic joint replacement surgeons.

## 2025-01-24 NOTE — TELEPHONE ENCOUNTER
Called pt due to being scheduled incorrectly with Carol Teran PA-C. Advised pt we would like to schedule her with Kirti Garcia or [facility]. Pt states she is not sure which surgeon she would like to be scheduled with and would like to do more research. Pt then requested to be scheduled with Dr. Cotto - first available on 3/5. Pt elected to be scheduled then. Advised pt to call back if she needs to reschedule.    Irma Valenzuela, MS, OTC  Clinical Assistant to Dr. Carol Jones      ----- Message from Carol Jones MD sent at 1/24/2025  2:53 PM CST -----  Please get patient scheduled for right total knee arthroplasty consultation with 1 of the orthopedic joint replacement surgeons (Kirti Garcia, [facility]o)

## 2025-01-24 NOTE — Clinical Note
Please get patient scheduled for right total knee arthroplasty consultation with 1 of the orthopedic joint replacement surgeons (Kirti Garcia, [facility]o)

## 2025-01-24 NOTE — TELEPHONE ENCOUNTER
Referral for procedure from PAT appointment      Spoke to patient to schedule procedure(s) Colonoscopy       Physician to perform procedure(s)  Physician, colonoscopy  Date of Procedure (s) 03/07/25  Arrival Time 07:15 AM  Time of Procedure(s) 08:15 AM   Location of Procedure(s) [place] 4th Floor  Type of Rx Prep sent to patient: PEG  Instructions provided to patient via [facility]    Patient was informed on the following information and verbalized understanding. Screening questionnaire reviewed with patient and complete. If procedure requires anesthesia, a responsible adult needs to be present to accompany the patient home, patient cannot drive after receiving anesthesia. Appointment details are tentative, especially check-in time. Patient will receive a prep-op call 7 days prior to confirm check-in time for procedure. If applicable the patient should contact their pharmacy to verify Rx for procedure prep is ready for pick-up. Patient was advised to call the scheduling department at [phone] if pharmacy states no Rx is available. Patient was advised to call the endoscopy scheduling department if any questions or concerns arise.      [facility] Scheduling Department

## 2025-02-10 ENCOUNTER — PATIENT MESSAGE (OUTPATIENT)
Dept: ORTHOPEDICS | Facility: CLINIC | Age: 60
End: 2025-02-10
Payer: COMMERCIAL

## 2025-02-26 ENCOUNTER — OFFICE VISIT (OUTPATIENT)
Dept: DERMATOLOGY | Facility: CLINIC | Age: 60
End: 2025-02-26
Payer: COMMERCIAL

## 2025-02-26 DIAGNOSIS — L82.1 SEBORRHEIC KERATOSES: ICD-10-CM

## 2025-02-26 DIAGNOSIS — L98.9 SKIN LESION: ICD-10-CM

## 2025-02-26 DIAGNOSIS — L81.4 LENTIGO: ICD-10-CM

## 2025-02-26 DIAGNOSIS — D18.01 CHERRY ANGIOMA: ICD-10-CM

## 2025-02-26 DIAGNOSIS — Z12.83 SCREENING EXAM FOR SKIN CANCER: Primary | ICD-10-CM

## 2025-02-26 PROCEDURE — 99203 OFFICE O/P NEW LOW 30 MIN: CPT | Mod: S$GLB,,, | Performed by: STUDENT IN AN ORGANIZED HEALTH CARE EDUCATION/TRAINING PROGRAM

## 2025-02-26 PROCEDURE — 99999 PR PBB SHADOW E&M-EST. PATIENT-LVL III: CPT | Mod: PBBFAC,,, | Performed by: STUDENT IN AN ORGANIZED HEALTH CARE EDUCATION/TRAINING PROGRAM

## 2025-02-26 PROCEDURE — 1160F RVW MEDS BY RX/DR IN RCRD: CPT | Mod: CPTII,S$GLB,, | Performed by: STUDENT IN AN ORGANIZED HEALTH CARE EDUCATION/TRAINING PROGRAM

## 2025-02-26 PROCEDURE — 1159F MED LIST DOCD IN RCRD: CPT | Mod: CPTII,S$GLB,, | Performed by: STUDENT IN AN ORGANIZED HEALTH CARE EDUCATION/TRAINING PROGRAM

## 2025-02-26 NOTE — PROGRESS NOTES
Subjective:      Patient ID:  Erin Johnson is a 59 y.o. female who presents for   Chief Complaint   Patient presents with    Skin Check     TBSE      Pt is here for TBSE   Pt states she has spots under breast   Pt states she denies any h/o mm or nmsc       Review of Systems   Skin:  Positive for daily sunscreen use. Negative for itching, rash, dry skin, activity-related sunscreen use and recent sunburn.   Hematologic/Lymphatic: Does not bruise/bleed easily.       Objective:   Physical Exam   Constitutional: She appears well-developed and well-nourished. No distress.   Neurological: She is alert and oriented to person, place, and time. She is not disoriented.   Psychiatric: She has a normal mood and affect.   Skin:   Areas Examined (abnormalities noted in diagram):   Scalp / Hair Palpated and Inspected  Head / Face Inspection Performed  Neck Inspection Performed  Chest / Axilla Inspection Performed  Abdomen Inspection Performed  Genitals / Buttocks / Groin Inspection Performed  Back Inspection Performed  RUE Inspected  LUE Inspection Performed  RLE Inspected  LLE Inspection Performed  Nails and Digits Inspection Performed                 Diagram Legend     Erythematous scaling macule/papule c/w actinic keratosis       Vascular papule c/w angioma      Pigmented verrucoid papule/plaque c/w seborrheic keratosis      Yellow umbilicated papule c/w sebaceous hyperplasia      Irregularly shaped tan macule c/w lentigo     1-2 mm smooth white papules consistent with Milia      Movable subcutaneous cyst with punctum c/w epidermal inclusion cyst      Subcutaneous movable cyst c/w pilar cyst      Firm pink to brown papule c/w dermatofibroma      Pedunculated fleshy papule(s) c/w skin tag(s)      Evenly pigmented macule c/w junctional nevus     Mildly variegated pigmented, slightly irregular-bordered macule c/w mildly atypical nevus      Flesh colored to evenly pigmented papule c/w intradermal nevus       Pink pearly  papule/plaque c/w basal cell carcinoma      Erythematous hyperkeratotic cursted plaque c/w SCC      Surgical scar with no sign of skin cancer recurrence      Open and closed comedones      Inflammatory papules and pustules      Verrucoid papule consistent consistent with wart     Erythematous eczematous patches and plaques     Dystrophic onycholytic nail with subungual debris c/w onychomycosis     Umbilicated papule    Erythematous-base heme-crusted tan verrucoid plaque consistent with inflamed seborrheic keratosis     Erythematous Silvery Scaling Plaque c/w Psoriasis     See annotation      Assessment / Plan:        Screening exam for skin cancer  Total body skin examination performed today including at least 12 points as noted in physical examination. No lesions suspicious for malignancy noted.    Recommend daily sun protection/avoidance, use of at least SPF 30, broad spectrum sunscreen (OTC drug), skin self examinations, and routine physician surveillance to optimize early detection      Cherry angioma  Lentigo  Seborrheic keratoses  Reassurance given to patient. No treatment is necessary.   Treatment of benign, asymptomatic lesions may be considered cosmetic.         Follow up in about 3 years (around 2/26/2028).

## 2025-02-26 NOTE — PATIENT INSTRUCTIONS
Sunscreen Guidelines  Sunscreen protects your skin by absorbing and reflecting ultraviolet rays. All sunscreens have a sun protection factor (SPF) rating that indicates how long a sunscreen will remain effective on the skin.    Why protect your skin?  The sun's rays are composed of many different wavelengths, including UVA, UVB, and visible light that each affect the skin differently.    UVB: sunburn, photoaging, skin cancer (melanoma, basal cell, and squamous cell carcinomas) and modulation of the skin's immune system.    UVA: similar to above but thought to contribute more to aging; at the same dose of UVB it is less powerful however the sun has 10-20 times the levels of UVA as compared with UVB.  Visible light: implicated in causing unwanted darkening of skin, such as melasma and post-inflammatory hyperpigmentation in darker skin types     If I have dark skin, do I need to worry about the sun?    More darkly pigmented skin is more protected against UV-induced skin cancer, sunburn, and photoaging, though may still suffer from sun-related conditions, including melasma, hyperpigmentation, and other dark spots.    Sun avoidance  As a general rule, stay out of the sun as much as possible between 10 a.m. - 4 p.m.    Download the EPA UV index kerry to track the UV index by hour in your zip code.      Which sunscreen should I choose?  The best sunscreen to use varies by individual. The one that feels best on your skin and fits your lifestyle will be the one you will likely use most regularly.   Active ingredients of sunscreen vary by , and may be a chemical (such as avobenzone or oxybenzone) or physical agent (such as zinc oxide or titanium dioxide). I recommend a physical agent.  A water-resistant sunscreen is one that maintains the SPF level after 40 minutes of water exposure. A very water-resistant sunscreen maintains the SPF level after 80 minutes of water exposure.    Sunscreen: this is the last layer in  "sun protection   Be generous: 1 shot glass of sunscreen for your body, ½ teaspoon for your face/neck  Reapply every 2 hours  Broad spectrum (provides UVA/UVB protection), SPF 50 or above  Avoid spray sunscreens: less effective and have been found to contain benzene (carcinogen)    Sun protective clothing  Although sunscreen helps minimize sun damage, no sunscreen completely blocks all wavelengths of UV light. Wearing sun protective clothing such as hats, rashguards or swim shirts, and long sleeves and/or pants, as well as avoiding sun exposure from 10 a.m. to 4 p.m. will help protect your skin from overexposure and minimize sun damage. Seek shade.  Long sleeved clothing, hats, and sunglasses: makes sun protection easier, more effective, and can even be more affordable, since sunscreen needs to be reapplied frequently.    Solumbra (www.sunprectautions.Powtoon)  DinersGroup (www.SBR [employer])  Coolibar (www.Dabble.Powtoon)  Land's end (www.Mirametrix)  Hats from [employer] (www.helenkaminski.com)    My Favorite Sunscreens:  Physical blockers: Can have a "white case" but in general are more effective  - Face: CeraVe tinted mineral sunscreen, Bare Minerals complexion rescue (20 shades), Elta MD (UV elements, UV physical, UV restore, etc), Tizo ultra zinc tinted, Cetaphil Sheer Mineral Face Liquid Sunscreen  - Body: Blue Lizard, Neutrogena Sheer Zinc, Eucerin Daily Protection, Aveeno Baby   "

## 2025-03-07 ENCOUNTER — ANESTHESIA (OUTPATIENT)
Dept: ENDOSCOPY | Facility: HOSPITAL | Age: 60
End: 2025-03-07
Payer: COMMERCIAL

## 2025-03-07 ENCOUNTER — HOSPITAL ENCOUNTER (OUTPATIENT)
Facility: HOSPITAL | Age: 60
Discharge: HOME | End: 2025-03-07
Attending: INTERNAL MEDICINE | Admitting: INTERNAL MEDICINE
Payer: COMMERCIAL

## 2025-03-07 ENCOUNTER — ANESTHESIA EVENT (OUTPATIENT)
Dept: ENDOSCOPY | Facility: HOSPITAL | Age: 60
End: 2025-03-07
Payer: COMMERCIAL

## 2025-03-07 VITALS
OXYGEN SATURATION: 97 % | TEMPERATURE: 97 F | HEIGHT: 67 IN | RESPIRATION RATE: 14 BRPM | HEART RATE: 59 BPM | WEIGHT: 191.13 LBS | BODY MASS INDEX: 30 KG/M2 | DIASTOLIC BLOOD PRESSURE: 58 MMHG | SYSTOLIC BLOOD PRESSURE: 105 MMHG

## 2025-03-07 DIAGNOSIS — M25.561 RIGHT KNEE PAIN, UNSPECIFIED CHRONICITY: Primary | ICD-10-CM

## 2025-03-07 DIAGNOSIS — Z80.0 FAMILY HISTORY OF COLON CANCER: Primary | ICD-10-CM

## 2025-03-07 PROCEDURE — 37000009 HC ANESTHESIA EA ADD 15 MINS: Performed by: INTERNAL MEDICINE

## 2025-03-07 PROCEDURE — 45385 COLONOSCOPY W/LESION REMOVAL: CPT | Mod: 33,,, | Performed by: INTERNAL MEDICINE

## 2025-03-07 PROCEDURE — 25000003 PHARM REV CODE 250: Performed by: INTERNAL MEDICINE

## 2025-03-07 PROCEDURE — 37000008 HC ANESTHESIA 1ST 15 MINUTES: Performed by: INTERNAL MEDICINE

## 2025-03-07 PROCEDURE — 88305 TISSUE EXAM BY PATHOLOGIST: CPT | Performed by: PATHOLOGY

## 2025-03-07 PROCEDURE — 63600175 PHARM REV CODE 636 W HCPCS: Performed by: NURSE ANESTHETIST, CERTIFIED REGISTERED

## 2025-03-07 PROCEDURE — 27201089 HC SNARE, DISP (ANY): Performed by: INTERNAL MEDICINE

## 2025-03-07 PROCEDURE — 45385 COLONOSCOPY W/LESION REMOVAL: CPT | Mod: 33 | Performed by: INTERNAL MEDICINE

## 2025-03-07 RX ORDER — PROPOFOL 10 MG/ML
INJECTION, EMULSION INTRAVENOUS CONTINUOUS PRN
Status: DISCONTINUED | OUTPATIENT
Start: 2025-03-07 | End: 2025-03-07

## 2025-03-07 RX ORDER — PROPOFOL 10 MG/ML
VIAL (ML) INTRAVENOUS
Status: DISCONTINUED | OUTPATIENT
Start: 2025-03-07 | End: 2025-03-07

## 2025-03-07 RX ORDER — LIDOCAINE HYDROCHLORIDE 20 MG/ML
INJECTION, SOLUTION EPIDURAL; INFILTRATION; INTRACAUDAL; PERINEURAL
Status: DISCONTINUED | OUTPATIENT
Start: 2025-03-07 | End: 2025-03-07

## 2025-03-07 RX ORDER — SODIUM CHLORIDE 9 MG/ML
INJECTION, SOLUTION INTRAVENOUS CONTINUOUS
Status: DISCONTINUED | OUTPATIENT
Start: 2025-03-07 | End: 2025-03-07 | Stop reason: HOSPADM

## 2025-03-07 RX ADMIN — PROPOFOL 70 MG: 10 INJECTION, EMULSION INTRAVENOUS at 08:03

## 2025-03-07 RX ADMIN — PROPOFOL 30 MG: 10 INJECTION, EMULSION INTRAVENOUS at 08:03

## 2025-03-07 RX ADMIN — LIDOCAINE HYDROCHLORIDE 100 MG: 20 INJECTION, SOLUTION EPIDURAL; INFILTRATION; INTRACAUDAL; PERINEURAL at 08:03

## 2025-03-07 RX ADMIN — SODIUM CHLORIDE: 0.9 INJECTION, SOLUTION INTRAVENOUS at 07:03

## 2025-03-07 RX ADMIN — PROPOFOL 50 MG: 10 INJECTION, EMULSION INTRAVENOUS at 08:03

## 2025-03-07 RX ADMIN — PROPOFOL 150 MCG/KG/MIN: 10 INJECTION, EMULSION INTRAVENOUS at 08:03

## 2025-03-07 NOTE — TRANSFER OF CARE
"Anesthesia Transfer of Care Note    Patient: Erin Johnson    Procedure(s) Performed: Procedure(s) (LRB):  COLONOSCOPY, SCREENING, LOW RISK PATIENT (N/A)    Patient location: GI    Anesthesia Type: general    Transport from OR: Transported from OR on room air with adequate spontaneous ventilation    Post pain: adequate analgesia    Post assessment: no apparent anesthetic complications and tolerated procedure well    Post vital signs: stable    Level of consciousness: awake    Nausea/Vomiting: no nausea/vomiting    Complications: none    Transfer of care protocol was followed      Last vitals: Visit Vitals  BP (!) 146/84 (BP Location: Left arm, Patient Position: Lying)   Pulse 78   Temp 35.6 °C (96 °F) (Temporal)   Resp 12   Ht 5' 7" (1.702 m)   Wt 86.7 kg (191 lb 2.2 oz)   LMP  (LMP Unknown)   SpO2 96%   Breastfeeding No   BMI 29.94 kg/m²     "

## 2025-03-07 NOTE — ANESTHESIA POSTPROCEDURE EVALUATION
Anesthesia Post Evaluation    Patient: Erin Johnson    Procedure(s) Performed: Procedure(s) (LRB):  COLONOSCOPY, SCREENING, LOW RISK PATIENT (N/A)    Final Anesthesia Type: general      Patient location during evaluation: PACU  Patient participation: Yes- Able to Participate  Level of consciousness: awake and alert  Post-procedure vital signs: reviewed and stable  Pain management: adequate  Airway patency: patent    PONV status at discharge: No PONV  Anesthetic complications: no      Cardiovascular status: blood pressure returned to baseline  Respiratory status: unassisted  Hydration status: euvolemic  Follow-up not needed.              Vitals Value Taken Time   /58 03/07/25 09:38   Temp 36.2 °C (97.2 °F) 03/07/25 09:10   Pulse 59 03/07/25 09:38   Resp 14 03/07/25 09:38   SpO2 97 % 03/07/25 09:38         Event Time   Out of Recovery 09:44:28         Pain/Giuliano Score: Giuliano Score: 8 (3/7/2025  9:10 AM)

## 2025-03-07 NOTE — PROVATION PATIENT INSTRUCTIONS
Discharge Summary/Instructions after an Endoscopic Procedure  Patient Name: Erin Johnson  Patient MRN: [id number]  Patient YOB: 1965 Friday, March 7, 2025  Rafael Rai MD  Dear patient,  As a result of recent federal legislation (The Federal Cures Act), you may   receive lab or pathology results from your procedure in your [facility]   account before your physician is able to contact you. Your physician or   their representative will relay the results to you with their   recommendations at their soonest availability.  Thank you,  RESTRICTIONS:  During your procedure today, you received medications for sedation.  These   medications may affect your judgment, balance and coordination.  Therefore,   for 24 hours, you have the following restrictions:   - DO NOT drive a car, operate machinery, make legal/financial decisions,   sign important papers or drink alcohol.    ACTIVITY:  Today: no heavy lifting, straining or running due to procedural   sedation/anesthesia.  The following day: return to full activity including work.  DIET:  Eat and drink normally unless instructed otherwise.     TREATMENT FOR COMMON SIDE EFFECTS:  - Mild abdominal pain, nausea, belching, bloating or excessive gas:  rest,   eat lightly and use a heating pad.  - Sore Throat: treat with throat lozenges and/or gargle with warm salt   water.  - Because air was used during the procedure, expelling large amounts of air   from your rectum or belching is normal.  - If a bowel prep was taken, you may not have a bowel movement for 1-3 days.    This is normal.  SYMPTOMS TO WATCH FOR AND REPORT TO YOUR PHYSICIAN:  1. Abdominal pain or bloating, other than gas cramps.  2. Chest pain.  3. Back pain.  4. Signs of infection such as: chills or fever occurring within 24 hours   after the procedure.  5. Rectal bleeding, which would show as bright red, maroon, or black stools.   (A tablespoon of blood from the rectum is not serious, especially  if   hemorrhoids are present.)  6. Vomiting.  7. Weakness or dizziness.  GO DIRECTLY TO THE NEAREST EMERGENCY ROOM IF YOU HAVE ANY OF THE FOLLOWING:      Difficulty breathing              Chills and/or fever over 101 F   Persistent vomiting and/or vomiting blood   Severe abdominal pain   Severe chest pain   Black, tarry stools   Bleeding- more than one tablespoon   Any other symptom or condition that you feel may need urgent attention  Your doctor recommends these additional instructions:  If any biopsies were taken, your doctors clinic will contact you in 1 to 2   weeks with any results.  - Discharge patient to home (ambulatory).   - Patient has a contact number available for emergencies.  The signs and   symptoms of potential delayed complications were discussed with the   patient.  Return to normal activities tomorrow.  Written discharge   instructions were provided to the patient.   - Resume previous diet.   - Continue present medications.   - Await pathology results.   - Repeat colonoscopy in 5 years for surveillance.   - Return to referring physician as previously scheduled.  For questions, problems or results please call your physician - Rafael Rai MD at Work:  [phone].  [facility], EMERGENCY ROOM PHONE NUMBER: [phone]  IF A COMPLICATION OR EMERGENCY SITUATION ARISES AND YOU ARE UNABLE TO REACH   YOUR PHYSICIAN - GO DIRECTLY TO THE EMERGENCY ROOM.  MD Rafael Castro MD  3/7/2025 9:08:27 AM  This report has been verified and signed electronically.  Dear patient,  As a result of recent federal legislation (The Federal Cures Act), you may   receive lab or pathology results from your procedure in your [facility]   account before your physician is able to contact you. Your physician or   their representative will relay the results to you with their   recommendations at their soonest availability.  Thank you,  PROVATION

## 2025-03-07 NOTE — ANESTHESIA PREPROCEDURE EVALUATION
03/07/2025  Erin Johnson is a 59 y.o., female.    Social History[1]    Medications Ordered Prior to Encounter[2]    Pre-op Assessment    I have reviewed the NPO Status.      Review of Systems  Anesthesia Hx:               Denies Personal Hx of Anesthesia complications.                    Social:  Smoker       Hematology/Oncology:                   Hematology Comments: HIV                    Cardiovascular:                hyperlipidemia    Exercise limited 2/2 arthritis in knees but denies CP/ SOB                           Pulmonary:       Recent URI (~ 2 weeks ago), resolved                 Renal/:  Renal/ Normal                 Hepatic/GI:  Hepatic/GI Normal                    Musculoskeletal:  Arthritis          Spine Disorders: cervical Degenerative disease and Disc disease           Neurological:  Neurology Normal                                      Endocrine:  Endocrine Normal                Physical Exam    Airway:  Mallampati: III   Neck ROM: Normal ROM        Anesthesia Plan  Type of Anesthesia, risks & benefits discussed:    Anesthesia Type: Gen Natural Airway  Intra-op Monitoring Plan: Standard ASA Monitors  Induction:  IV  Informed Consent: Informed consent signed with the Patient and all parties understand the risks and agree with anesthesia plan.  All questions answered.   ASA Score: 2    Ready For Surgery From Anesthesia Perspective.     .           [1]   Social History  Socioeconomic History    Marital status: Single   Tobacco Use    Smoking status: Former     Types: Cigarettes    Smokeless tobacco: Never   Substance and Sexual Activity    Alcohol use: Yes     Comment: wine occassionally    Drug use: Never    Sexual activity: Not Currently     Social Drivers of Health     Financial Resource Strain: Low Risk  (1/24/2025)    Overall Financial Resource Strain (CARDIA)     Difficulty of  Paying Living Expenses: Not very hard   Food Insecurity: No Food Insecurity (1/24/2025)    Hunger Vital Sign     Worried About Running Out of Food in the Last Year: Never true     Ran Out of Food in the Last Year: Never true   Transportation Needs: No Transportation Needs (1/3/2024)    PRAPARE - Transportation     Lack of Transportation (Medical): No     Lack of Transportation (Non-Medical): No   Physical Activity: Inactive (1/24/2025)    Exercise Vital Sign     Days of Exercise per Week: 0 days     Minutes of Exercise per Session: 40 min   Stress: Stress Concern Present (1/24/2025)    [employer] - Occupational Stress Questionnaire     Feeling of Stress : To some extent   Housing Stability: Low Risk  (1/3/2024)    Housing Stability Vital Sign     Unable to Pay for Housing in the Last Year: No     Number of Places Lived in the Last Year: 2     Unstable Housing in the Last Year: No   [2]   No current facility-administered medications on file prior to encounter.     Current Outpatient Medications on File Prior to Encounter   Medication Sig Dispense Refill    DOVATO  mg Tab Take 1 tablet by mouth Daily. 90 tablet 1    ibuprofen (ADVIL,MOTRIN) 800 MG tablet Take 1 tablet (800 mg total) by mouth 3 (three) times daily as needed for Pain. 30 tablet 0    pravastatin (PRAVACHOL) 10 MG tablet Take 1 tablet (10 mg total) by mouth once daily. 90 tablet 3    triazolam 0.25 MG tablet Take by mouth.

## 2025-03-07 NOTE — H&P
Short Stay Endoscopy History and Physical    PCP - No, Primary Doctor  Referring Physician - Yefri Avilez MD  [address] [place] [address]    Procedure - Colonoscopy  ASA - per anesthesia  Mallampati - per anesthesia  History of Anesthesia problems - no  Family history Anesthesia problems -  no   Plan of anesthesia - General    HPI  59 y.o. female  Reason for procedure:   Family history of colon cancer [Z80.0]        ROS:  Constitutional: No fevers, chills, No weight loss  CV: No chest pain  Pulm: No cough, No shortness of breath  GI: see HPI    Medical History:  has no past medical history on file.    Surgical History:  has no past surgical history on file.    Family History: family history includes Cancer (age of onset: 59) in her father..    Social History:  reports that she has quit smoking. Her smoking use included cigarettes. She has never used smokeless tobacco. She reports current alcohol use. She reports that she does not use drugs.    Review of patient's allergies indicates:  No Known Allergies    Medications:   Prescriptions Prior to Admission[1]    Physical Exam:    Vital Signs:   Vitals:    03/07/25 0745   BP: (!) 146/84   Pulse: 78   Resp: 12   Temp: 96 °F (35.6 °C)       General Appearance: Well appearing in no acute distress  Abdomen: Soft, non tender, non distended with normal bowel sounds, no masses    Labs:  Lab Results   Component Value Date    WBC 6.80 07/25/2024    HGB 14.1 07/25/2024    HCT 42.6 07/25/2024     07/25/2024    CHOL 265 (H) 07/25/2024    TRIG 107 07/25/2024    HDL 66 07/25/2024    ALT 26 07/25/2024    AST 28 07/25/2024     07/25/2024    K 4.2 07/25/2024     07/25/2024    CREATININE 1.1 07/25/2024    BUN 9 07/25/2024    CO2 26 07/25/2024    HGBA1C 5.6 07/25/2024       I have explained the risks and benefits of this endoscopic procedure to the patient including but not limited to bleeding, inflammation, infection, perforation, and  death.      Rafael Rai MD       [1]   Medications Prior to Admission   Medication Sig Dispense Refill Last Dose/Taking    DOVATO  mg Tab Take 1 tablet by mouth Daily. 90 tablet 1 3/6/2025    ibuprofen (ADVIL,MOTRIN) 800 MG tablet Take 1 tablet (800 mg total) by mouth 3 (three) times daily as needed for Pain. 30 tablet 0 Past Week    pravastatin (PRAVACHOL) 10 MG tablet Take 1 tablet (10 mg total) by mouth once daily. 90 tablet 3 3/6/2025    triazolam 0.25 MG tablet Take by mouth.   More than a month

## 2025-03-12 ENCOUNTER — HOSPITAL ENCOUNTER (OUTPATIENT)
Dept: RADIOLOGY | Facility: HOSPITAL | Age: 60
Discharge: HOME | End: 2025-03-12
Attending: ORTHOPAEDIC SURGERY
Payer: COMMERCIAL

## 2025-03-12 ENCOUNTER — OFFICE VISIT (OUTPATIENT)
Dept: ORTHOPEDICS | Facility: CLINIC | Age: 60
End: 2025-03-12
Payer: COMMERCIAL

## 2025-03-12 VITALS — BODY MASS INDEX: 30.45 KG/M2 | WEIGHT: 194 LBS | HEIGHT: 67 IN

## 2025-03-12 DIAGNOSIS — M17.11 PRIMARY OSTEOARTHRITIS OF RIGHT KNEE: Primary | ICD-10-CM

## 2025-03-12 DIAGNOSIS — M25.561 RIGHT KNEE PAIN, UNSPECIFIED CHRONICITY: ICD-10-CM

## 2025-03-12 DIAGNOSIS — M17.11 PRIMARY OSTEOARTHRITIS OF RIGHT KNEE: ICD-10-CM

## 2025-03-12 DIAGNOSIS — M25.361 KNEE INSTABILITY, RIGHT: ICD-10-CM

## 2025-03-12 DIAGNOSIS — G89.29 CHRONIC PAIN OF RIGHT KNEE: ICD-10-CM

## 2025-03-12 DIAGNOSIS — M25.561 CHRONIC PAIN OF RIGHT KNEE: ICD-10-CM

## 2025-03-12 LAB
FINAL PATHOLOGIC DIAGNOSIS: NORMAL
GROSS: NORMAL
Lab: NORMAL

## 2025-03-12 PROCEDURE — 99999 PR PBB SHADOW E&M-EST. PATIENT-LVL III: CPT | Mod: PBBFAC,,, | Performed by: ORTHOPAEDIC SURGERY

## 2025-03-12 PROCEDURE — 73562 X-RAY EXAM OF KNEE 3: CPT | Mod: 26,,, | Performed by: RADIOLOGY

## 2025-03-12 PROCEDURE — 73562 X-RAY EXAM OF KNEE 3: CPT | Mod: TC,50

## 2025-03-12 NOTE — PROGRESS NOTES
Subjective:      Patient ID: Erin Johnson is a 59 y.o. female.    Chief Complaint: Pain of the Right Knee      History of Present Illness    CHIEF COMPLAINT:  - Bilateral knee pain, right worse than left.    HPI:  - Presents with bilateral knee pain ongoing for over 12 years  - Right knee more symptomatic, causing significant difficulty with activities such as getting out of a chair, climbing stairs, and walking  - Describes pain as extremely severe during these activities  - Continues to walk despite pain, understanding the importance of movement  - Swims when possible due to reduced pressure on the joints  - Received gel injections in both knees in November  - Left knee responded well to gel injection, but right knee did not  - Has had two injections over the past, with the most recent being a gel injection  - Latest injection provided relief for only a day or two, after which she felt nerve involvement  - Reports mechanical symptoms in the right knee, including hyperextension and locking  - Very cautious with right knee, trying not to do much, but it does hyperextend and lock at times  - Received injections from Carol Jones in the sports medicine department  - Denies any history of blood clots or diabetes  _ Right knee arthroscopy in 2011  PREVIOUS TREATMENTS:  - Gel injections in both knees in November (year not specified). The left knee responded well to the injection, while the right knee did not.  - Two rounds of injections over an unspecified period.  - PT was recommended in the past, but patient indicates it was not effective.  - Erin continues to walk and swim when possible, as self-management techniques.    WORK STATUS:  - Currently unemployed.  - Recently graduated from a social work program and obtained [employer].  - Actively looking for work.  - Previously worked in international public health.  - Knee problems affecting daily activities, including difficulty getting out of a chair, going up and down  "stairs, and walking.         No past medical history on file.  Past Surgical History:   Procedure Laterality Date    COLONOSCOPY, SCREENING, LOW RISK PATIENT N/A 3/7/2025    Procedure: COLONOSCOPY, SCREENING, LOW RISK PATIENT;  Surgeon: Rafael Rai MD;  Location: [address];  Service: Endoscopy;  Laterality: N/A;  1/24 ref by Dr. Yefri Avilez, Peg, portal. kristina  1/27-precall complete-ES     Family History   Problem Relation Name Age of Onset    Cancer Father  59        colon cancer     Social History[1]  Medications Ordered Prior to Encounter[2]  Review of patient's allergies indicates:  No Known Allergies    Review of Systems   Constitutional: Negative for chills, fever and night sweats.   HENT:  Negative for hearing loss.    Eyes:  Negative for blurred vision and double vision.   Cardiovascular:  Negative for chest pain, claudication and leg swelling.   Respiratory:  Negative for shortness of breath.    Endocrine: Negative for polydipsia, polyphagia and polyuria.   Hematologic/Lymphatic: Negative for adenopathy and bleeding problem. Does not bruise/bleed easily.   Skin:  Negative for poor wound healing.   Gastrointestinal:  Negative for diarrhea and heartburn.   Genitourinary:  Negative for bladder incontinence.   Neurological:  Negative for focal weakness, headaches, numbness, paresthesias and sensory change.   Psychiatric/Behavioral:  The patient is not nervous/anxious.    Allergic/Immunologic: Negative for persistent infections.         Objective:      Body mass index is 30.81 kg/m².  Vitals:    03/12/25 1400   Weight: 88 kg (194 lb 0.1 oz)   Height: 5' 6.54" (1.69 m)         General    Constitutional: She is oriented to person, place, and time. She appears well-developed and well-nourished.   HENT:   Head: Normocephalic and atraumatic.   Eyes: EOM are normal.   Cardiovascular:  Normal rate.            Pulmonary/Chest: Effort normal.   Neurological: She is alert and oriented to person, place, " and time.   Psychiatric: She has a normal mood and affect. Her behavior is normal.     General Musculoskeletal Exam   Gait: abnormal and antalgic       Right Knee Exam     Inspection   Erythema: absent  Scars: present (scope)  Swelling: present  Effusion: absent  Deformity: present (varus)  Bruising: absent    Tenderness   The patient is tender to palpation of the medial joint line.    Range of Motion   Extension:  0   Flexion:  120     Tests   Ligament Examination   Lachman: normal (-1 to 2mm)   MCL - Valgus: normal (0 to 2mm)  LCL - Varus: normal  Patella   Passive Patellar Tilt: neutral    Other   Popliteal (Baker's) Cyst: present  Sensation: normal    Left Knee Exam     Inspection   Erythema: absent  Scars: absent  Swelling: absent  Effusion: absent  Deformity: absent  Bruising: absent    Tenderness   The patient is experiencing no tenderness.     Range of Motion   Extension:  0   Flexion:  130     Tests   Stability   Lachman: normal (-1 to 2mm)   MCL - Valgus: normal (0 to 2mm)  LCL - Varus: normal (0 to 2mm)  Patella   Passive Patellar Tilt: neutral    Other   Sensation: normal    Muscle Strength   Right Lower Extremity   Hip Abduction: 5/5   Quadriceps:  5/5   Hamstrin/5   Left Lower Extremity   Hip Abduction: 5/5   Quadriceps:  5/5   Hamstrin/5     Reflexes     Left Side  Quadriceps:  2+    Right Side   Quadriceps:  2+    Vascular Exam     Right Pulses  Dorsalis Pedis:      2+          Left Pulses  Dorsalis Pedis:      2+          Edema  Right Lower Leg: absent  Left Lower Leg: absent      Reviewed by me:  IMAGING:  - Bilateral knee radiographs obtained today, reviewed by the practitioner:  - Right knee: Kellgren-Louie grade 4 changes, complete loss of articular cartilage medially and laterally, subluxation laterally of the tibia on the femur.  - Left knee: Kellgren-Louie grade 4 changes, less severe than the right.  - Both knees: Patellofemoral involvement, subchondral cysts, sclerosis, and  osteophyte formation.  - Diagnosis: Wear and tear type of arthritis in both knees.               Assessment:       Encounter Diagnoses   Name Primary?    Chronic pain of right knee     Primary osteoarthritis of right knee     Knee instability, right           Plan:       Erin was seen today for pain.    Diagnoses and all orders for this visit:    Chronic pain of right knee  -     Ambulatory referral/consult to Orthopedics    Primary osteoarthritis of right knee  -     Ambulatory referral/consult to Orthopedics    Knee instability, right  -     Ambulatory referral/consult to Orthopedics    Treatment options were discussed. The surgical process of robotically assisted right knee replacement was discussed in detail with the patient including a detailed discussion of the procedure itself (including visual model, x-ray review, and literature review). We discussed options including implant type and why I believe the implant selected is a good match for the patient's needs. The patient expressed understanding and agrees to proceed with the planned surgeryThe typical perioperative and post-operative course was discussed and perioperative risks were discussed to the patient's satisfaction.  Risks and complications discussed included but were not limited to the risks of anesthetic complications, infection, bleeding, wound healing complications, fracture through the pin sights, stiffness, aseptic loosening, instability, limb length inequality, neurologic dysfunction including numbness and weakness, additional surgery,  DVT, pulmonary embolism, perioperative medical risks (cardiac, pulmonary, renal, neurologic), and death and the patient elects to proceed.  The patient should get medically cleared and attend the joint seminar.      ASA for DVT prophylaxis    Same day             This note was generated with the assistance of ambient listening technology. Verbal consent was obtained by the patient and accompanying visitor(s) for  the recording of patient appointment to facilitate this note. I attest to having reviewed and edited the generated note for accuracy, though some syntax or spelling errors may persist. Please contact the author of this note for any clarification.         [1]   Social History  Socioeconomic History    Marital status: Single   Tobacco Use    Smoking status: Former     Types: Cigarettes    Smokeless tobacco: Never   Substance and Sexual Activity    Alcohol use: Yes     Comment: wine occassionally    Drug use: Never    Sexual activity: Not Currently     Social Drivers of Health     Financial Resource Strain: Low Risk  (1/24/2025)    Overall Financial Resource Strain (CARDIA)     Difficulty of Paying Living Expenses: Not very hard   Food Insecurity: No Food Insecurity (1/24/2025)    Hunger Vital Sign     Worried About Running Out of Food in the Last Year: Never true     Ran Out of Food in the Last Year: Never true   Transportation Needs: No Transportation Needs (1/3/2024)    PRAPARE - Transportation     Lack of Transportation (Medical): No     Lack of Transportation (Non-Medical): No   Physical Activity: Inactive (1/24/2025)    Exercise Vital Sign     Days of Exercise per Week: 0 days     Minutes of Exercise per Session: 40 min   Stress: Stress Concern Present (1/24/2025)    [employer] - Occupational Stress Questionnaire     Feeling of Stress : To some extent   Housing Stability: Low Risk  (1/3/2024)    Housing Stability Vital Sign     Unable to Pay for Housing in the Last Year: No     Number of Places Lived in the Last Year: 2     Unstable Housing in the Last Year: No   [2]   Current Outpatient Medications on File Prior to Visit   Medication Sig Dispense Refill    DOVATO  mg Tab Take 1 tablet by mouth Daily. 90 tablet 1    ibuprofen (ADVIL,MOTRIN) 800 MG tablet Take 1 tablet (800 mg total) by mouth 3 (three) times daily as needed for Pain. 30 tablet 0    pravastatin (PRAVACHOL) 10 MG  tablet Take 1 tablet (10 mg total) by mouth once daily. 90 tablet 3    triazolam 0.25 MG tablet Take by mouth.       No current facility-administered medications on file prior to visit.

## 2025-03-18 ENCOUNTER — RESULTS FOLLOW-UP (OUTPATIENT)
Dept: GASTROENTEROLOGY | Facility: CLINIC | Age: 60
End: 2025-03-18

## 2025-03-19 ENCOUNTER — TELEPHONE (OUTPATIENT)
Dept: ORTHOPEDICS | Facility: CLINIC | Age: 60
End: 2025-03-19
Payer: COMMERCIAL

## 2025-03-19 NOTE — TELEPHONE ENCOUNTER
Called pt and asked if she would like a sooner surgery date as Dr. Cotto has one for 5/6/25. Pt accepted the new date and I changed all pre-op appts and post-op appts to reflect that. Pt verbalized understanding and has no further questions.

## 2025-03-24 DIAGNOSIS — M79.609 PAIN IN EXTREMITY, UNSPECIFIED EXTREMITY: Primary | ICD-10-CM

## 2025-03-24 DIAGNOSIS — Z01.818 PRE-OP TESTING: ICD-10-CM

## 2025-03-24 NOTE — ANESTHESIA PAT ROS NOTE
03/24/2025  Erin Johnson is a 59 y.o., female.      Pre-op Assessment          Review of Systems           Anesthesia Assessment: Preoperative EQUATION    Planned Procedure: Procedure(s) (LRB):  ARTHROPLASTY, KNEE, TOTAL, USING COMPUTER-ASSISTED NAVIGATION: RIGHT: SAME DAY (Right)  Requested Anesthesia Type:Regional  Surgeon: Babatunde Cotto MD  Service: Orthopedics  Known or anticipated Date of Surgery:5/6/2025    Surgeon notes: reviewed    Electronic QUestionnaire Assessment completed via nurse interview with patient.        Triage considerations:     The patient has no apparent active cardiac condition (No unstable coronary Syndrome such as severe unstable angina or recent [<1 month] myocardial infarction, decompensated CHF, severe valvular   disease or significant arrhythmia)    Previous anesthesia records:GETA and No problems  3/7/25 COLONOSCOPY   Airway:  Mallampati: III   Neck ROM: Normal ROM    Last PCP note:  no PCP  Subspecialty notes: Infectious Disease, Ortho    Other important co-morbidities: HLD, Obesity, and HIV diagnosed in 1998 ([place]), Occ Smoker        Tests already available:  No recent tests.   7/25/24 CMP, CBC, CD4, A1C            Instructions given. (See in Nurse's note)    Optimization:  Anesthesia Preop Clinic Assessment  Indicated POC    Medical Opinion Indicated       Sub-specialist consult indicated:   [facility] NP         Plan:    Testing:  CMP, Hematology Profile, and PT/INR   Pre-anesthesia  visit       Visit focus: possible regional anesthesia and/or nerve block      Consultation:[facility] NP for medical and anesthesia optimization       Patient  has previously scheduled Medical Appointment: 4/16    Navigation: Tests Scheduled.              Consults scheduled.             Results will be tracked by Preop Clinic.      4/16/25 Patient is optimized for  surgery.        Gaby Jaramillo NP  Perioperative Medicine  [facility]

## 2025-04-01 ENCOUNTER — CLINICAL SUPPORT (OUTPATIENT)
Dept: REHABILITATION | Facility: HOSPITAL | Age: 60
End: 2025-04-01
Attending: ORTHOPAEDIC SURGERY
Payer: COMMERCIAL

## 2025-04-01 DIAGNOSIS — R26.2 DIFFICULTY WALKING: Primary | ICD-10-CM

## 2025-04-01 DIAGNOSIS — M17.11 PRIMARY OSTEOARTHRITIS OF RIGHT KNEE: ICD-10-CM

## 2025-04-01 PROCEDURE — 97112 NEUROMUSCULAR REEDUCATION: CPT

## 2025-04-01 PROCEDURE — 97162 PT EVAL MOD COMPLEX 30 MIN: CPT

## 2025-04-01 PROCEDURE — 97530 THERAPEUTIC ACTIVITIES: CPT

## 2025-04-01 NOTE — PROGRESS NOTES
Outpatient Rehab    Physical Therapy Evaluation    Patient Name: Erin Johnson  MRN: [id number]  YOB: 1965  Encounter Date: 4/1/2025    Therapy Diagnosis:   Encounter Diagnoses   Name Primary?    Primary osteoarthritis of right knee     Difficulty walking Yes     Physician: Babatunde Cotto MD    Physician Orders: Eval and Treat  Medical Diagnosis: Primary osteoarthritis of right knee    Visit # / Visits Authorized:  1 / 1  Insurance Authorization Period: 3/12/2025 to 3/12/2026  Date of Evaluation: 4/1/2025  Plan of Care Certification: 4/1/2025 to 4/4/2025     Time In: 1105   Time Out: 1200  Total Time: 55   Total Billable Time: 55    Intake Outcome Measure for FOTO Survey    Therapist reviewed FOTO scores for Erin Johnson on 4/1/2025.   FOTO report - see Media section or FOTO account episode details.     Intake Score:  %         Subjective   History of Present Illness  Erin is a 59 y.o. female                  History of Present Condition/Illness: Pt reports history of 10 year R chronic knee pain. She is undergoing R TKA on May 6th. She is also reporting chronic L knee pain as well.     Activities of Daily Living              Difficulty with prolonged walking and weightbearing activities.         Pain     Patient reports a current pain level of 0/10. Pain at best is reported as 0/10. Pain at worst is reported as 9/10.   Location: R knee  Pain Qualities: Sharp, Aching, Dull  Pain-Relieving Factors: Massage, Lying down, Rest, Medications - over-the-counter, Ice  Pt reports R hip OA. She says she has R hip pain and reports pain down into RLE (calf and thigh).       Living Arrangements  Single story home. 5 steps to enter with bilateral railing. Lives alone but has family coming into town for the first month after surgery.       Employment      Not working currently but interviewing.       Past Medical History/Physical Systems Review:   Erin Johnson  has no past medical history on file.    Erin  Alex  has a past surgical history that includes colonoscopy, screening, low risk patient (N/A, 3/7/2025).    Erin has a current medication list which includes the following prescription(s): dovato, ibuprofen, pravastatin, and triazolam.    Review of patient's allergies indicates:  No Known Allergies     Objective   Knee Observations  Right Knee Observations  Present: Straight Leg Raise Extensor Lag (-14)             Knee Range of Motion   Right Knee   Active (deg) Passive (deg) Pain   Flexion 128       Extension -3           Left Knee   Active (deg) Passive (deg) Pain   Flexion 135       Extension 0 2                        Knee Strength   Right Strength Right Pain Left Strength Left  Pain   Flexion (S2) 3  (gave in due to pain) 4  (gave in due to pain)   Prone Flexion           Extension (L3) 3+   4       Knee Extensor Lag  Lag Present: Right (-14)              Timed Up & Go (TUG)  Time: 14 seconds     An older adult who takes >=12 seconds to complete the TUG is at risk for falling.       Sit to Stand Testing      The patient completed 11 repetitions of a sit to stand transfer in 30 seconds. using BUE support              Treatment:  Balance/Neuromuscular Re-Education  NMR 1: Heel slides, 1 x 10, hold 5 seconds with strap  NMR 2: Quad sets, 1 x 10, hold 10 seconds  NMR 3: Heel prop for extension, 4 minutes  Therapeutic Activity  TA 1: Education on TKA rehab, signs/sx of infection/DVT, edema management, quad function    Time Entry(in minutes):  PT Evaluation (Moderate) Time Entry: 30  Neuromuscular Re-Education Time Entry: 10  Therapeutic Activity Time Entry: 15    Assessment & Plan   Assessment  Erin presents with a condition of Moderate complexity.   Presentation of Symptoms: Evolving            Patient Goal for Therapy (PT): Return to walking normally and swimming  Prognosis: Good  Assessment Details: Pt presents to outpatient physical therapy with chronic R knee pain. She has R TKA scheduled for May 6th.  Patient demonstrates decreased lower extremity strength, impaired right quadriceps motor control and strength, difficulty ambulating long distances, and reduced functional mobility. Patient would benefit from skilled outpatient physical therapy to address motor control and strength deficits so that she may return to full independence with all household and personal ADL's, and improve overall functional mobility, and meet all social and recreational needs.    Plan  From a physical therapy perspective, the patient would benefit from: Skilled Rehab Services    Planned therapy interventions include: Therapeutic exercise, Therapeutic activities, Neuromuscular re-education, and Manual therapy.    Planned modalities to include: Cryotherapy (cold pack) and Electrical stimulation - passive/unattended.        Visit Frequency: 1 times In Total          This plan was discussed with Patient.   Discussion participants: Agreed Upon Plan of Care             Patient's spiritual, cultural, and educational needs considered and patient agreeable to plan of care and goals.           Goals:   Resolved       Functional outcome       Patient will demonstrate independence in home program for support of progression (Met)       Start:  04/01/25    Expected End:  04/04/25    Resolved:  04/01/25             Jones Ritchie PT, DPT

## 2025-04-03 ENCOUNTER — TELEPHONE (OUTPATIENT)
Dept: ORTHOPEDICS | Facility: CLINIC | Age: 60
End: 2025-04-03
Payer: COMMERCIAL

## 2025-04-03 NOTE — TELEPHONE ENCOUNTER
Called pt to see if she could move up her surgery date but she is unable due to her help flying into town and those plans have been made. Pt verbalized understanding and has no further questions.

## 2025-04-15 PROBLEM — E78.5 HYPERLIPIDEMIA: Status: ACTIVE | Noted: 2025-04-15

## 2025-04-15 PROBLEM — Z21 HIV (HUMAN IMMUNODEFICIENCY VIRUS INFECTION): Status: ACTIVE | Noted: 2025-04-15

## 2025-04-15 NOTE — OUTPATIENT SUBJECTIVE & OBJECTIVE
Outpatient Subjective & Objective      Chief Complaint: Preoperative evaulation, perioperative medical management, and complication reduction plan.     Functional Capacity: swims 60 laps 3-4x weekly without CP/SOB.       Anesthesia issues: None    Difficulty mouth opening: jaw cracks    Steroid use in the last 12 months:  right knee    Dental Issues: None    Family anesthesia difficulty: None       Family Hx of Thrombosis: None        Past Medical History:   Diagnosis Date    GERD (gastroesophageal reflux disease)          Past Medical History Pertinent Negatives:   Diagnosis Date Noted    Anxiety 04/16/2025    Asthma 04/16/2025    Basal cell carcinoma 02/26/2025    COPD (chronic obstructive pulmonary disease) 04/16/2025    Coronary artery disease 04/16/2025    Deep vein thrombosis 04/16/2025    Depression 04/16/2025    Diabetes mellitus, type 2 04/16/2025    Disorder of kidney and ureter 04/16/2025    Hypertension 04/16/2025    Melanoma 02/26/2025    Myocardial infarction 04/16/2025    Pulmonary embolism 04/16/2025    Seizures 04/16/2025    Squamous cell carcinoma of skin 02/26/2025    Stroke 04/16/2025    Thyroid disease 04/16/2025         Past Surgical History:   Procedure Laterality Date    COLONOSCOPY, SCREENING, LOW RISK PATIENT N/A 03/07/2025    Procedure: COLONOSCOPY, SCREENING, LOW RISK PATIENT;  Surgeon: Rafael Rai MD;  Location: [address]);  Service: Endoscopy;  Laterality: N/A;  1/24 ref by Dr. Yefri Avilez, Peg, portal. kristina  1/27-precall complete-ES    REPAIR OF MENISCUS OF KNEE Right 2006       Review of Systems   Constitutional:  Negative for chills, fatigue, fever and unexpected weight change.   HENT:  Positive for tinnitus. Negative for congestion, hearing loss, rhinorrhea, sore throat and trouble swallowing.    Eyes:  Negative for visual disturbance.   Respiratory:  Negative for cough, chest tightness, shortness of breath and wheezing.         STOP BANG risk factors:  Snoring  "  Cardiovascular:  Negative for chest pain, palpitations and leg swelling.   Gastrointestinal:  Negative for constipation and diarrhea.        Denies Fatty liver, Hepatitis   Genitourinary:  Negative for decreased urine volume, difficulty urinating, dysuria, frequency, hematuria and urgency.   Musculoskeletal:  Positive for arthralgias (right knee). Negative for back pain, neck pain and neck stiffness.   Allergic/Immunologic: Positive for environmental allergies.   Neurological:  Negative for dizziness, syncope, weakness, numbness and headaches.   Hematological:  Does not bruise/bleed easily.   Psychiatric/Behavioral:  Negative for sleep disturbance and suicidal ideas.               VITALS  Visit Vitals  /61 (BP Location: Right arm, Patient Position: Sitting)   Pulse 74   Temp 98.6 °F (37 °C)   Ht 5' 7" (1.702 m)   Wt 88 kg (194 lb 1.8 oz)   LMP  (LMP Unknown)   SpO2 97%   BMI 30.40 kg/m²          Physical Exam  Vitals reviewed.   Constitutional:       General: She is not in acute distress.     Appearance: She is well-developed. She is obese.   HENT:      Head: Normocephalic.      Nose: Nose normal.      Mouth/Throat:      Pharynx: No oropharyngeal exudate.   Eyes:      General:         Right eye: No discharge.         Left eye: No discharge.      Conjunctiva/sclera: Conjunctivae normal.      Pupils: Pupils are equal, round, and reactive to light.   Neck:      Thyroid: No thyromegaly.      Vascular: No carotid bruit or JVD.      Trachea: No tracheal deviation.   Cardiovascular:      Rate and Rhythm: Normal rate and regular rhythm.      Pulses:           Carotid pulses are 2+ on the right side and 2+ on the left side.       Dorsalis pedis pulses are 2+ on the right side and 2+ on the left side.        Posterior tibial pulses are 2+ on the right side and 2+ on the left side.      Heart sounds: Normal heart sounds. No murmur heard.  Pulmonary:      Effort: Pulmonary effort is normal. No respiratory distress.      " Breath sounds: Normal breath sounds. No stridor. No wheezing, rhonchi or rales.   Abdominal:      General: Bowel sounds are normal. There is no distension.      Palpations: Abdomen is soft.      Tenderness: There is no abdominal tenderness. There is no guarding.   Musculoskeletal:      Cervical back: Normal range of motion.      Right lower leg: No edema.      Left lower leg: No edema.   Lymphadenopathy:      Cervical: No cervical adenopathy.   Skin:     General: Skin is warm and dry.      Capillary Refill: Capillary refill takes less than 2 seconds.      Findings: No erythema or rash.   Neurological:      Mental Status: She is alert and oriented to person, place, and time.   Psychiatric:         Behavior: Behavior normal. Behavior is cooperative.          Significant Labs:  Lab Results   Component Value Date    WBC 7.13 04/16/2025    HGB 13.8 04/16/2025    HCT 43.2 04/16/2025     04/16/2025    CHOL 265 (H) 07/25/2024    TRIG 107 07/25/2024    HDL 66 07/25/2024    ALT 25 04/16/2025    AST 28 04/16/2025     04/16/2025    K 4.1 04/16/2025     04/16/2025    CREATININE 0.8 04/16/2025    BUN 12 04/16/2025    CO2 24 04/16/2025    INR 1.0 04/16/2025    HGBA1C 5.6 07/25/2024                 Active Cardiac Conditions: None        Revised Cardiac Risk Index   High -Risk Surgery  Intraperitoneal; Intrathoracic; suprainguinal vascular Yes- + 1 No- 0   History of Ischemic Heart Disease   (Hx of MI/positive exercise test/current chest pain due to ischemia/use of nitrate therapy/EKG with pathological Q waves) Yes- + 1 No- 0   History of CHF  (Pulmonary edema/bilateral rales or S3 gallop/PND/CXR showing pulmonary vascular redistribution) Yes- + 1 No- 0   History of CVA   (Prior stroke or TIA) Yes- + 1 No- 0   Pre-operative treatment with insulin Yes- + 1 No- 0   Pre-operative creatinine > 2mg/dl Yes- + 1 No- 0   Total: 0      Risk Status:  Estimated risk of cardiac complications after non-cardiac surgery using  the Revised Cardiac Risk Index for Preoperative risk is 3.9 %      ARISCAT (Canet) risk index: Low: 1.6% risk of post-op pulmonary complications; Intermediate: 13.3% risk of post-op pulmonary complications if surgery is > 3 hours.     American Society of Anesthesiologists Physical Status classification (ASA): 2             Outpatient Subjective & Objective

## 2025-04-15 NOTE — ASSESSMENT & PLAN NOTE
Dx'd [id number]  Treated with Dovato; stable.   Followed per Infectious Disease  RNA not detected 7/2024  CD4 count is 1186 on 7/25/24 labs

## 2025-04-15 NOTE — HPI
This is a 59 y.o. female  who presents today for a preoperative evaluation in preparation for right knee arthroplasty.  Surgery is indicated for osteoarthritis of right knee.   Patient is new to me.  The history has been obtained by speaking with the patient and reviewing the electronic medical record and/or outside health information. Significant health conditions for the perioperative period are discussed below in assessment and plan.   Patient reports current health status to be Good.  Denies any new symptoms before surgery.

## 2025-04-15 NOTE — PROGRESS NOTES
[facility]  Progress Note    Patient Name: Erin Johnson  MRN: [id number]  Date of Evaluation- 04/17/2025  PCP- No, Primary Doctor    Future cases for Erin Johnson [[id number]]       Case ID Status Date Time Joseph Procedure Provider Location    [id number] [facility] 5/6/2025  9:58  ARTHROPLASTY, KNEE, TOTAL, USING COMPUTER-ASSISTED NAVIGATION: RIGHT: SAME DAY Babatunde Cotto MD [[id number]] [facility] OR            HPI:  This is a 59 y.o. female  who presents today for a preoperative evaluation in preparation for right knee arthroplasty.  Surgery is indicated for osteoarthritis of right knee.   Patient is new to me.  The history has been obtained by speaking with the patient and reviewing the electronic medical record and/or outside health information. Significant health conditions for the perioperative period are discussed below in assessment and plan.   Patient reports current health status to be Good.  Denies any new symptoms before surgery.       Subjective/ Objective:     Chief Complaint: Preoperative evaulation, perioperative medical management, and complication reduction plan.     Functional Capacity: swims 60 laps 3-4x weekly without CP/SOB.       Anesthesia issues: None    Difficulty mouth opening: jaw cracks    Steroid use in the last 12 months:  right knee    Dental Issues: None    Family anesthesia difficulty: None       Family Hx of Thrombosis: None        Past Medical History:   Diagnosis Date    GERD (gastroesophageal reflux disease)          Past Medical History Pertinent Negatives:   Diagnosis Date Noted    Anxiety 04/16/2025    Asthma 04/16/2025    Basal cell carcinoma 02/26/2025    COPD (chronic obstructive pulmonary disease) 04/16/2025    Coronary artery disease 04/16/2025    Deep vein thrombosis 04/16/2025    Depression 04/16/2025    Diabetes mellitus, type 2 04/16/2025    Disorder of kidney and ureter 04/16/2025    Hypertension 04/16/2025    Melanoma 02/26/2025    Myocardial infarction  "04/16/2025    Pulmonary embolism 04/16/2025    Seizures 04/16/2025    Squamous cell carcinoma of skin 02/26/2025    Stroke 04/16/2025    Thyroid disease 04/16/2025         Past Surgical History:   Procedure Laterality Date    COLONOSCOPY, SCREENING, LOW RISK PATIENT N/A 03/07/2025    Procedure: COLONOSCOPY, SCREENING, LOW RISK PATIENT;  Surgeon: Rafael Rai MD;  Location: [facility] ([address] [place] [address]);  Service: Endoscopy;  Laterality: N/A;  1/24 ref by Dr. Yefri Heredu, Peg, portal. kristina  1/27-precall complete-ES    REPAIR OF MENISCUS OF KNEE Right 2006       Review of Systems   Constitutional:  Negative for chills, fatigue, fever and unexpected weight change.   HENT:  Positive for tinnitus. Negative for congestion, hearing loss, rhinorrhea, sore throat and trouble swallowing.    Eyes:  Negative for visual disturbance.   Respiratory:  Negative for cough, chest tightness, shortness of breath and wheezing.         STOP BANG risk factors:  Snoring   Cardiovascular:  Negative for chest pain, palpitations and leg swelling.   Gastrointestinal:  Negative for constipation and diarrhea.        Denies Fatty liver, Hepatitis   Genitourinary:  Negative for decreased urine volume, difficulty urinating, dysuria, frequency, hematuria and urgency.   Musculoskeletal:  Positive for arthralgias (right knee). Negative for back pain, neck pain and neck stiffness.   Allergic/Immunologic: Positive for environmental allergies.   Neurological:  Negative for dizziness, syncope, weakness, numbness and headaches.   Hematological:  Does not bruise/bleed easily.   Psychiatric/Behavioral:  Negative for sleep disturbance and suicidal ideas.               VITALS  Visit Vitals  /61 (BP Location: Right arm, Patient Position: Sitting)   Pulse 74   Temp 98.6 °F (37 °C)   Ht 5' 7" (1.702 m)   Wt 88 kg (194 lb 1.8 oz)   LMP  (LMP Unknown)   SpO2 97%   BMI 30.40 kg/m²          Physical Exam  Vitals reviewed.   Constitutional:       General: She is " not in acute distress.     Appearance: She is well-developed. She is obese.   HENT:      Head: Normocephalic.      Nose: Nose normal.      Mouth/Throat:      Pharynx: No oropharyngeal exudate.   Eyes:      General:         Right eye: No discharge.         Left eye: No discharge.      Conjunctiva/sclera: Conjunctivae normal.      Pupils: Pupils are equal, round, and reactive to light.   Neck:      Thyroid: No thyromegaly.      Vascular: No carotid bruit or JVD.      Trachea: No tracheal deviation.   Cardiovascular:      Rate and Rhythm: Normal rate and regular rhythm.      Pulses:           Carotid pulses are 2+ on the right side and 2+ on the left side.       Dorsalis pedis pulses are 2+ on the right side and 2+ on the left side.        Posterior tibial pulses are 2+ on the right side and 2+ on the left side.      Heart sounds: Normal heart sounds. No murmur heard.  Pulmonary:      Effort: Pulmonary effort is normal. No respiratory distress.      Breath sounds: Normal breath sounds. No stridor. No wheezing, rhonchi or rales.   Abdominal:      General: Bowel sounds are normal. There is no distension.      Palpations: Abdomen is soft.      Tenderness: There is no abdominal tenderness. There is no guarding.   Musculoskeletal:      Cervical back: Normal range of motion.      Right lower leg: No edema.      Left lower leg: No edema.   Lymphadenopathy:      Cervical: No cervical adenopathy.   Skin:     General: Skin is warm and dry.      Capillary Refill: Capillary refill takes less than 2 seconds.      Findings: No erythema or rash.   Neurological:      Mental Status: She is alert and oriented to person, place, and time.   Psychiatric:         Behavior: Behavior normal. Behavior is cooperative.          Significant Labs:  Lab Results   Component Value Date    WBC 7.13 04/16/2025    HGB 13.8 04/16/2025    HCT 43.2 04/16/2025     04/16/2025    CHOL 265 (H) 07/25/2024    TRIG 107 07/25/2024    HDL 66 07/25/2024     ALT 25 04/16/2025    AST 28 04/16/2025     04/16/2025    K 4.1 04/16/2025     04/16/2025    CREATININE 0.8 04/16/2025    BUN 12 04/16/2025    CO2 24 04/16/2025    INR 1.0 04/16/2025    HGBA1C 5.6 07/25/2024                 Active Cardiac Conditions: None        Revised Cardiac Risk Index   High -Risk Surgery  Intraperitoneal; Intrathoracic; suprainguinal vascular Yes- + 1 No- 0   History of Ischemic Heart Disease   (Hx of MI/positive exercise test/current chest pain due to ischemia/use of nitrate therapy/EKG with pathological Q waves) Yes- + 1 No- 0   History of CHF  (Pulmonary edema/bilateral rales or S3 gallop/PND/CXR showing pulmonary vascular redistribution) Yes- + 1 No- 0   History of CVA   (Prior stroke or TIA) Yes- + 1 No- 0   Pre-operative treatment with insulin Yes- + 1 No- 0   Pre-operative creatinine > 2mg/dl Yes- + 1 No- 0   Total: 0      Risk Status:  Estimated risk of cardiac complications after non-cardiac surgery using the Revised Cardiac Risk Index for Preoperative risk is 3.9 %      ARISCAT (Canet) risk index: Low: 1.6% risk of post-op pulmonary complications; Intermediate: 13.3% risk of post-op pulmonary complications if surgery is > 3 hours.     American Society of Anesthesiologists Physical Status classification (ASA): 2                            Assessment/Plan:     Primary osteoarthritis of right knee  Scheduled with Dr. Cotto 5/5/25 for right knee arthroplasty.    HIV (human immunodeficiency virus infection)  Dx'd [id number]  Treated with Dovato; stable.   Followed per Infectious Disease  RNA not detected 7/2024  CD4 count is 1186 on 7/25/24 labs    Hyperlipidemia  Recommend weight loss, healthy diet (DASH/Mediterranean) and exercise.   Patient should continue to exercise 30 minutes at least five times weekly once recovered from surgery. Limit alcohol.  Treated with: pravastatin    Current occasional smoker  Sometime social smoker; quantity varies  Advised to quit smoking to  decrease risk of infection and delayed healing.   There is an increased risk for blood clots, cardiovascular and pulmonary complications.     Snoring  Denies BENJI. Possible sleep apnea: recommend caution with sedating medication in the perioperative period.   Sleep Study referral declined     Obesity with body mass index (BMI) of 30.0 to 39.9  Patient would benefit from weight loss and has set goals to achieve success.   Lifestyle changes should be made by eating healthy, exercising at least 150 minutes weekly, and avoiding sedentary behavior.     Recommendations:   Stay active and exercise using cardio, stretching, and weights. Exercise at least 30 minutes 5x weekly. Try to get at least 7500-10,000 steps daily. Recommend Mediterranean diet and avoid fast food, decrease salt intake and carbohydrates such as bread, potatoes, sugary snacks/sodas, cakes and cookies.      Discussion/Management of Perioperative Care    Thromboembolic prophylaxis (VTE) Care: Risk factors for thrombosis include: obesity and surgical procedure.  I recommend prophylaxis of thromboembolism with the use of compression stockings/pneumatic devices, and/or pharmacologic agents. The benefits should outweigh the risks for pharmacologic prophylaxis in the perioperative period. I also encourage early ambulation if not contraindicated during the post-operative period.    To reduce the risk of pulmonary complications, prophylactic recommendations include: smoking cessation, incentive spirometry use/deep breathing, early ambulation, and pain control.    To reduce the risk of postoperative renal complications, I recommend the patient maintain adequate hydration.  Avoid/reduce NSAIDS and PERRIN-2 inhibitors use as well as IV contrast for renal protection.    I recommend the use of appropriate prophylactic antibiotics to reduce the risk of surgical site infections.      The patient is at an increased risk for urinary retention due to : possible regional  anesthesia. I recommend to avoid/decrease the use of benzodiazepines, anticholinergics, and Benadryl in the perioperative period. I also recommend using opioids for the shortest period of time if possible.          This visit was focused on Preoperative evaluation, Perioperative Medical management, complication reduction plans. I suggest that the patient follows up with primary care or relevant sub specialists for ongoing health care.    I appreciate the opportunity to be involved in this patients care. Please feel free to contact me if there were any questions about this consultation.        I spent a total of 45 minutes on the day of the visit.  This includes face to face time and non-face to face time preparing to see the patient (e.g., review of tests), obtaining and/or reviewing separately obtained history, documenting clinical information in the electronic or other health record, independently interpreting results and communicating results to the patient/family/caregiver, or care coordinator.       Patient is optimized for surgery.      Gaby Jaramillo NP  Perioperative Medicine  [facility]

## 2025-04-16 ENCOUNTER — OFFICE VISIT (OUTPATIENT)
Dept: INTERNAL MEDICINE | Facility: CLINIC | Age: 60
End: 2025-04-16
Payer: COMMERCIAL

## 2025-04-16 ENCOUNTER — HOSPITAL ENCOUNTER (OUTPATIENT)
Dept: RADIOLOGY | Facility: HOSPITAL | Age: 60
Discharge: HOME | End: 2025-04-16
Attending: ORTHOPAEDIC SURGERY
Payer: COMMERCIAL

## 2025-04-16 ENCOUNTER — OFFICE VISIT (OUTPATIENT)
Dept: ORTHOPEDICS | Facility: CLINIC | Age: 60
End: 2025-04-16
Payer: COMMERCIAL

## 2025-04-16 VITALS
HEART RATE: 74 BPM | DIASTOLIC BLOOD PRESSURE: 61 MMHG | WEIGHT: 194.13 LBS | SYSTOLIC BLOOD PRESSURE: 114 MMHG | OXYGEN SATURATION: 97 % | BODY MASS INDEX: 30.47 KG/M2 | TEMPERATURE: 99 F | HEIGHT: 67 IN

## 2025-04-16 DIAGNOSIS — M17.11 PRIMARY OSTEOARTHRITIS OF RIGHT KNEE: Primary | ICD-10-CM

## 2025-04-16 DIAGNOSIS — Z01.818 PREOPERATIVE EXAMINATION: Primary | ICD-10-CM

## 2025-04-16 DIAGNOSIS — M17.11 PRIMARY OSTEOARTHRITIS OF RIGHT KNEE: ICD-10-CM

## 2025-04-16 DIAGNOSIS — E66.9 OBESITY WITH BODY MASS INDEX (BMI) OF 30.0 TO 39.9: ICD-10-CM

## 2025-04-16 DIAGNOSIS — E78.5 HYPERLIPIDEMIA, UNSPECIFIED HYPERLIPIDEMIA TYPE: ICD-10-CM

## 2025-04-16 DIAGNOSIS — Z21 HIV INFECTION, UNSPECIFIED SYMPTOM STATUS: ICD-10-CM

## 2025-04-16 DIAGNOSIS — R06.83 SNORING: ICD-10-CM

## 2025-04-16 DIAGNOSIS — Z72.0 CURRENT OCCASIONAL SMOKER: ICD-10-CM

## 2025-04-16 PROBLEM — F17.200 CURRENT SMOKER: Status: ACTIVE | Noted: 2025-04-16

## 2025-04-16 PROCEDURE — 3008F BODY MASS INDEX DOCD: CPT | Mod: CPTII,S$GLB,, | Performed by: NURSE PRACTITIONER

## 2025-04-16 PROCEDURE — 99204 OFFICE O/P NEW MOD 45 MIN: CPT | Mod: S$GLB,,, | Performed by: NURSE PRACTITIONER

## 2025-04-16 PROCEDURE — 73700 CT LOWER EXTREMITY W/O DYE: CPT | Mod: 26,RT,, | Performed by: RADIOLOGY

## 2025-04-16 PROCEDURE — 1159F MED LIST DOCD IN RCRD: CPT | Mod: CPTII,S$GLB,,

## 2025-04-16 PROCEDURE — 99214 OFFICE O/P EST MOD 30 MIN: CPT | Mod: S$GLB,,,

## 2025-04-16 PROCEDURE — 99999 PR PBB SHADOW E&M-EST. PATIENT-LVL III: CPT | Mod: PBBFAC,,,

## 2025-04-16 PROCEDURE — 3078F DIAST BP <80 MM HG: CPT | Mod: CPTII,S$GLB,, | Performed by: NURSE PRACTITIONER

## 2025-04-16 PROCEDURE — 73700 CT LOWER EXTREMITY W/O DYE: CPT | Mod: TC,RT

## 2025-04-16 PROCEDURE — 99999 PR PBB SHADOW E&M-EST. PATIENT-LVL III: CPT | Mod: PBBFAC,,, | Performed by: NURSE PRACTITIONER

## 2025-04-16 PROCEDURE — 3074F SYST BP LT 130 MM HG: CPT | Mod: CPTII,S$GLB,, | Performed by: NURSE PRACTITIONER

## 2025-04-16 PROCEDURE — 1159F MED LIST DOCD IN RCRD: CPT | Mod: CPTII,S$GLB,, | Performed by: NURSE PRACTITIONER

## 2025-04-16 PROCEDURE — 1160F RVW MEDS BY RX/DR IN RCRD: CPT | Mod: CPTII,S$GLB,, | Performed by: NURSE PRACTITIONER

## 2025-04-16 PROCEDURE — 1160F RVW MEDS BY RX/DR IN RCRD: CPT | Mod: CPTII,S$GLB,,

## 2025-04-16 RX ORDER — ASPIRIN 81 MG/1
81 TABLET ORAL 2 TIMES DAILY
OUTPATIENT
Start: 2025-04-16

## 2025-04-16 RX ORDER — ACETAMINOPHEN 500 MG
1000 TABLET ORAL EVERY 6 HOURS
OUTPATIENT
Start: 2025-04-16

## 2025-04-16 RX ORDER — CELECOXIB 200 MG/1
400 CAPSULE ORAL ONCE
OUTPATIENT
Start: 2025-04-16 | End: 2025-04-16

## 2025-04-16 RX ORDER — PROCHLORPERAZINE EDISYLATE 5 MG/ML
5 INJECTION INTRAMUSCULAR; INTRAVENOUS EVERY 6 HOURS PRN
OUTPATIENT
Start: 2025-04-16

## 2025-04-16 RX ORDER — LIDOCAINE HYDROCHLORIDE 10 MG/ML
1 INJECTION, SOLUTION EPIDURAL; INFILTRATION; INTRACAUDAL; PERINEURAL
OUTPATIENT
Start: 2025-04-16

## 2025-04-16 RX ORDER — BISACODYL 10 MG/1
10 SUPPOSITORY RECTAL EVERY 12 HOURS PRN
OUTPATIENT
Start: 2025-04-16

## 2025-04-16 RX ORDER — CELECOXIB 200 MG/1
200 CAPSULE ORAL DAILY
Qty: 30 CAPSULE | Refills: 0 | Status: SHIPPED | OUTPATIENT
Start: 2025-04-16

## 2025-04-16 RX ORDER — PREGABALIN 75 MG/1
75 CAPSULE ORAL
OUTPATIENT
Start: 2025-04-16

## 2025-04-16 RX ORDER — AMOXICILLIN 250 MG
1 CAPSULE ORAL 2 TIMES DAILY
OUTPATIENT
Start: 2025-04-16

## 2025-04-16 RX ORDER — OXYCODONE HYDROCHLORIDE 5 MG/1
10 TABLET ORAL
Refills: 0 | OUTPATIENT
Start: 2025-04-16

## 2025-04-16 RX ORDER — ACETAMINOPHEN 500 MG
1000 TABLET ORAL
OUTPATIENT
Start: 2025-04-16

## 2025-04-16 RX ORDER — SODIUM CHLORIDE 9 MG/ML
INJECTION, SOLUTION INTRAVENOUS
OUTPATIENT
Start: 2025-04-16

## 2025-04-16 RX ORDER — MIDAZOLAM HYDROCHLORIDE 1 MG/ML
4 INJECTION, SOLUTION INTRAMUSCULAR; INTRAVENOUS
OUTPATIENT
Start: 2025-04-16 | End: 2025-04-17

## 2025-04-16 RX ORDER — METHOCARBAMOL 750 MG/1
750 TABLET, FILM COATED ORAL 3 TIMES DAILY
OUTPATIENT
Start: 2025-04-16

## 2025-04-16 RX ORDER — MUPIROCIN 20 MG/G
1 OINTMENT TOPICAL
OUTPATIENT
Start: 2025-04-16

## 2025-04-16 RX ORDER — POLYETHYLENE GLYCOL 3350 17 G/17G
17 POWDER, FOR SOLUTION ORAL DAILY
OUTPATIENT
Start: 2025-04-17

## 2025-04-16 RX ORDER — SODIUM CHLORIDE 0.9 % (FLUSH) 0.9 %
10 SYRINGE (ML) INJECTION
OUTPATIENT
Start: 2025-04-16

## 2025-04-16 RX ORDER — PREGABALIN 75 MG/1
75 CAPSULE ORAL NIGHTLY
OUTPATIENT
Start: 2025-04-16

## 2025-04-16 RX ORDER — NALOXONE HCL 0.4 MG/ML
0.02 VIAL (ML) INJECTION
OUTPATIENT
Start: 2025-04-16

## 2025-04-16 RX ORDER — SODIUM CHLORIDE 9 MG/ML
INJECTION, SOLUTION INTRAVENOUS CONTINUOUS
OUTPATIENT
Start: 2025-04-16 | End: 2025-04-17

## 2025-04-16 RX ORDER — ONDANSETRON HYDROCHLORIDE 2 MG/ML
4 INJECTION, SOLUTION INTRAVENOUS EVERY 8 HOURS PRN
OUTPATIENT
Start: 2025-04-16

## 2025-04-16 RX ORDER — OXYCODONE HYDROCHLORIDE 5 MG/1
5 TABLET ORAL
Refills: 0 | OUTPATIENT
Start: 2025-04-16

## 2025-04-16 RX ORDER — FENTANYL CITRATE 50 UG/ML
100 INJECTION, SOLUTION INTRAMUSCULAR; INTRAVENOUS
Refills: 0 | OUTPATIENT
Start: 2025-04-16 | End: 2025-04-17

## 2025-04-16 RX ORDER — FAMOTIDINE 20 MG/1
20 TABLET, FILM COATED ORAL 2 TIMES DAILY
OUTPATIENT
Start: 2025-04-16

## 2025-04-16 NOTE — ASSESSMENT & PLAN NOTE
Patient would benefit from weight loss and has set goals to achieve success.   Lifestyle changes should be made by eating healthy, exercising at least 150 minutes weekly, and avoiding sedentary behavior.     Recommendations:   Stay active and exercise using cardio, stretching, and weights. Exercise at least 30 minutes 5x weekly. Try to get at least 7500-10,000 steps daily. Recommend Mediterranean diet and avoid fast food, decrease salt intake and carbohydrates such as bread, potatoes, sugary snacks/sodas, cakes and cookies.

## 2025-04-16 NOTE — DISCHARGE INSTRUCTIONS
.Your surgery has been scheduled for:__________________________________________    You should report to:  ____[facility], located on the [place] side of the first floor of the           [facility] ([phone])  ____[facility], located on the [facility] side of the            Second floor of the [facility] ([phone])  ____3rd Floor [facility] located on the [facility] side of the [facility] [phone]  ____[facility]: located at [address] [address]. [address].     Please Note   Tell your doctor if you take Aspirin, products containing Aspirin, herbal medications  or blood thinners, such as Coumadin, Ticlid, or Plavix.  (Consult your provider regarding holding or stopping before surgery).  Arrange for someone to drive you home following surgery.  You will not be allowed to leave the surgical facility alone or drive yourself home following sedation and anesthesia.    Before Surgery  Stop taking all herbal medications, vitamins, and supplements 7 days prior to surgery  No Motrin/Advil (Ibuprofen) 7 days before surgery  No Aleve (Naproxen) 7 days before surgery   No Goody's/BC Powder 7 days before surgery  Refrain from drinking alcoholic beverages for 24 hours before and after surgery  Stop or limit smoking at least 24 hours prior to surgery  You may take Tylenol for pain    Night before Surgery  Do not eat or drink after midnight  Take a shower or bath (shower is recommended).  Bathe with Hibiclens soap or an antibacterial soap from the neck down.  If not supplied by your surgeon, hibiclens soap will need to be purchased over the counter in pharmacy.  Rinse soap off thoroughly.  Shampoo your hair with your regular shampoo    The Day of Surgery  Take another bath or shower with hibiclens or any antibacterial soap, to reduce the chance of infection.  Take heart and blood  pressure medications with a small sip of water, as advised by the perioperative team.  Do not take fluid pills  You may brush your teeth and rinse your mouth, but do not swallow any additional water.   Do not apply perfumes, powder, body lotions or deodorant on the day of surgery.  Nail polish should be removed.  Do not wear makeup or moisturizer  Wear comfortable clothes, such as a button front shirt and loose fitting pants.  Leave all jewelry, including body piercings, and valuables at home.    Bring any devices you will need after surgery such as crutches or canes.  If you have sleep apnea, please bring your CPAP machine  In the event that your physical condition changes including the onset of a cold or respiratory illness, or if you have to delay or cancel your surgery, please notify your surgeon.

## 2025-04-16 NOTE — ASSESSMENT & PLAN NOTE
Sometime social smoker; quantity varies  Advised to quit smoking to decrease risk of infection and delayed healing.   There is an increased risk for blood clots, cardiovascular and pulmonary complications.

## 2025-04-16 NOTE — H&P (VIEW-ONLY)
CC:  Right knee pain    Erin Johnson is a 59 y.o. female with history of Right knee pain. Pain is worse with activity and weight bearing.  Patient has experienced interference of activities of daily living due to decreased range of motion and an increase in joint pain and swelling.  Patient has failed non-operative treatment including NSAIDs, corticosteroid injections, viscosupplement injections, and activity modification.  Erin Johnson currently ambulates independently.     Relevant medical conditions of significance in perioperative period:  HLD- on medication managed by PCP  HIV- on medication managed by infectious disease  Current occasional smoker- reports last cigarette was 6 months ago     Given documented medical comorbidities including those listed above and based off of CMS criteria patient would meet inpatient admission status guidelines. This case has been approved as inpatient.     Past Medical History:   Diagnosis Date    GERD (gastroesophageal reflux disease)        Past Surgical History:   Procedure Laterality Date    COLONOSCOPY, SCREENING, LOW RISK PATIENT N/A 03/07/2025    Procedure: COLONOSCOPY, SCREENING, LOW RISK PATIENT;  Surgeon: Rafael Rai MD;  Location: [facility] ([address] [place] [address]);  Service: Endoscopy;  Laterality: N/A;  1/24 ref by Dr. Yefri Avilez, Peg, portal. kristina  1/27-precall complete-ES    REPAIR OF MENISCUS OF KNEE Right 2006       Family History   Problem Relation Name Age of Onset    Diverticulitis Mother      Diabetes Father      Cancer Father  59        colon cancer       Review of patient's allergies indicates:  No Known Allergies    Current Medications[1]    Review of Systems:  Constitutional: no fever or chills  Eyes: no visual changes  ENT: no nasal congestion or sore throat  Respiratory: no cough or shortness of breath  Cardiovascular: no chest pain or palpitations  Gastrointestinal: no nausea or vomiting, tolerating diet  Genitourinary: no hematuria or  dysuria  Integument/Breast: no rash or pruritis  Hematologic/Lymphatic: no easy bruising or lymphadenopathy  Musculoskeletal: positive for knee pain  Neurological: no seizures or tremors  Behavioral/Psych: no auditory or visual hallucinations  Endocrine: no heat or cold intolerance    PE:  LMP  (LMP Unknown)   General: Pleasant, cooperative, NAD   Gait: antalgic  HEENT: NCAT, sclera nonicteric   Lungs: Respirations clear bilaterally; equal and unlabored.   CV: S1S2; 2+ bilateral upper and lower extremity pulses.   Skin: Intact throughout with no rashes, erythema, or lesions  Extremities: No LE edema,  no erythema or warmth of the skin in either lower extremity.    Right knee exam:  Knee Range of Motion:  0-120, pain with passive range of motion  Effusion:none  Condition of skin:intact  Location of tenderness:Medial joint line   Strength:normal  Stability: stable to testing    Hip Examination: painless PROM of hip     Radiographs: Radiographs reveal advanced degenerative changes including subchondral cyst formation, subchondral sclerosis, osteophyte formation, joint space narrowing.     Knee Alignment: normal    Diagnosis: Primary osteoarthritis Right knee    Plan: Right total knee arthroplasty    Due to the serious nature of total joint infection and the high prevalence of community acquired MRSA, vancomycin will be used perioperatively.                 [1]   Current Outpatient Medications:     celecoxib (CELEBREX) 200 MG capsule, Take 1 capsule (200 mg total) by mouth once daily., Disp: 30 capsule, Rfl: 0    DOVATO  mg Tab, Take 1 tablet by mouth Daily., Disp: 90 tablet, Rfl: 1    ibuprofen (ADVIL,MOTRIN) 800 MG tablet, Take 1 tablet (800 mg total) by mouth 3 (three) times daily as needed for Pain., Disp: 30 tablet, Rfl: 0    pravastatin (PRAVACHOL) 10 MG tablet, Take 1 tablet (10 mg total) by mouth once daily., Disp: 90 tablet, Rfl: 3    triazolam 0.25 MG tablet, Take by mouth., Disp: , Rfl:

## 2025-04-16 NOTE — ASSESSMENT & PLAN NOTE
Denies BENJI. Possible sleep apnea: recommend caution with sedating medication in the perioperative period.   Sleep Study referral declined

## 2025-04-16 NOTE — PROGRESS NOTES
Erin Johnson is a 59 y.o. year old here today for pre surgery optimization visit  in preparation for a Right total knee arthroplasty to be performed by Dr. Cotto on 5/6/2025.  she was last seen and treated in the clinic on 3/12/2025. she will be medically optimized by the pre op center. There has been no significant change in medical status since last visit. No fever, chills, malaise, or unexplained weight change.      Allergies, Medications, past medical and surgical history reviewed.    Focused examination performed.    Patient did not request to see surgeon in clinic today. All questions answered. Patient encouraged to call with questions. Contact information given.     Pre, little, and post operative procedures and expectations discussed. Goals of successful surgery reviewed and include manageable pain levels, surgical site free of infection, medication management, and ambulation with PT/OT assistance. Healthy weight management discussed with patient and caregiver who were receptive to eduction of healthy diet and activity. No other necessary lifestyle changes identified. Educated patient about signs and symptoms of infection, medication management, anticoagulation therapy, risk of tobacco and alcohol use, and self-care to promote healing. Surgical guide given for future reference. Hibiclens given to patient with instructions. All questions were answered.     Erin Johnson verbalized an understanding to the education and goals. Patient has displayed readiness to engage in care and is ready to proceed with surgery.  Patient presents to clinic alone but reports her sister and friend are able and ready to provide assistance at home after discharge.    Surgical and blood consents signed.    DME will be arranged. The mobility limitation cannot be sufficiently resolved by the use of a cane. Patient's functional mobility deficit can be sufficiently resolved with the use of a (Rolling Walker or Walker). Patient's  "mobility limitation significantly impairs their ability to participate in one of more activities of daily living. The use of a (Rolling Walker or Walker) will significantly improve the patient's ability to participate in MRADLS and the patient will use it on regular basis in the home."     Erin Johnson will contact us if there are any questions, concerns, or changes in medical status prior to surgery.       Joint class:  03/20/2025    Patient has discussed discharge planning with surgeon. Patient will be discharged to home following surgery.   patient will be scheduled with [facility]. PT will be done at the [place] location.    30 minutes of time was spent on patient education, review of records, templating, H&P, , appointment scheduling and optimizing patient for surgery.      "

## 2025-04-16 NOTE — H&P
CC:  Right knee pain    Erin Johnson is a 59 y.o. female with history of Right knee pain. Pain is worse with activity and weight bearing.  Patient has experienced interference of activities of daily living due to decreased range of motion and an increase in joint pain and swelling.  Patient has failed non-operative treatment including NSAIDs, corticosteroid injections, viscosupplement injections, and activity modification.  Erin Johnson currently ambulates independently.     Relevant medical conditions of significance in perioperative period:  HLD- on medication managed by PCP  HIV- on medication managed by infectious disease  Current occasional smoker- reports last cigarette was 6 months ago     Given documented medical comorbidities including those listed above and based off of CMS criteria patient would meet inpatient admission status guidelines. This case has been approved as inpatient.     Past Medical History:   Diagnosis Date    GERD (gastroesophageal reflux disease)        Past Surgical History:   Procedure Laterality Date    COLONOSCOPY, SCREENING, LOW RISK PATIENT N/A 03/07/2025    Procedure: COLONOSCOPY, SCREENING, LOW RISK PATIENT;  Surgeon: Rafael Rai MD;  Location: [facility] ([address] [place] [address]);  Service: Endoscopy;  Laterality: N/A;  1/24 ref by Dr. Yefri Avilez, Peg, portal. kristina  1/27-precall complete-ES    REPAIR OF MENISCUS OF KNEE Right 2006       Family History   Problem Relation Name Age of Onset    Diverticulitis Mother      Diabetes Father      Cancer Father  59        colon cancer       Review of patient's allergies indicates:  No Known Allergies    Current Medications[1]    Review of Systems:  Constitutional: no fever or chills  Eyes: no visual changes  ENT: no nasal congestion or sore throat  Respiratory: no cough or shortness of breath  Cardiovascular: no chest pain or palpitations  Gastrointestinal: no nausea or vomiting, tolerating diet  Genitourinary: no hematuria or  dysuria  Integument/Breast: no rash or pruritis  Hematologic/Lymphatic: no easy bruising or lymphadenopathy  Musculoskeletal: positive for knee pain  Neurological: no seizures or tremors  Behavioral/Psych: no auditory or visual hallucinations  Endocrine: no heat or cold intolerance    PE:  LMP  (LMP Unknown)   General: Pleasant, cooperative, NAD   Gait: antalgic  HEENT: NCAT, sclera nonicteric   Lungs: Respirations clear bilaterally; equal and unlabored.   CV: S1S2; 2+ bilateral upper and lower extremity pulses.   Skin: Intact throughout with no rashes, erythema, or lesions  Extremities: No LE edema,  no erythema or warmth of the skin in either lower extremity.    Right knee exam:  Knee Range of Motion:  0-120, pain with passive range of motion  Effusion:none  Condition of skin:intact  Location of tenderness:Medial joint line   Strength:normal  Stability: stable to testing    Hip Examination: painless PROM of hip     Radiographs: Radiographs reveal advanced degenerative changes including subchondral cyst formation, subchondral sclerosis, osteophyte formation, joint space narrowing.     Knee Alignment: normal    Diagnosis: Primary osteoarthritis Right knee    Plan: Right total knee arthroplasty    Due to the serious nature of total joint infection and the high prevalence of community acquired MRSA, vancomycin will be used perioperatively.                 [1]   Current Outpatient Medications:     celecoxib (CELEBREX) 200 MG capsule, Take 1 capsule (200 mg total) by mouth once daily., Disp: 30 capsule, Rfl: 0    DOVATO  mg Tab, Take 1 tablet by mouth Daily., Disp: 90 tablet, Rfl: 1    ibuprofen (ADVIL,MOTRIN) 800 MG tablet, Take 1 tablet (800 mg total) by mouth 3 (three) times daily as needed for Pain., Disp: 30 tablet, Rfl: 0    pravastatin (PRAVACHOL) 10 MG tablet, Take 1 tablet (10 mg total) by mouth once daily., Disp: 90 tablet, Rfl: 3    triazolam 0.25 MG tablet, Take by mouth., Disp: , Rfl:

## 2025-05-05 ENCOUNTER — TELEPHONE (OUTPATIENT)
Dept: ORTHOPEDICS | Facility: CLINIC | Age: 60
End: 2025-05-05
Payer: COMMERCIAL

## 2025-05-05 ENCOUNTER — ANESTHESIA EVENT (OUTPATIENT)
Dept: SURGERY | Facility: HOSPITAL | Age: 60
End: 2025-05-05
Payer: COMMERCIAL

## 2025-05-05 DIAGNOSIS — Z96.651 S/P TOTAL KNEE REPLACEMENT, RIGHT: Primary | ICD-10-CM

## 2025-05-05 RX ORDER — ASPIRIN 81 MG/1
81 TABLET ORAL 2 TIMES DAILY
Qty: 60 TABLET | Refills: 0 | Status: SHIPPED | OUTPATIENT
Start: 2025-05-05 | End: 2025-06-05

## 2025-05-05 RX ORDER — POLYETHYLENE GLYCOL 3350 17 G/17G
17 POWDER, FOR SOLUTION ORAL DAILY
Qty: 119 G | Refills: 0 | Status: SHIPPED | OUTPATIENT
Start: 2025-05-05

## 2025-05-05 RX ORDER — DEXTROMETHORPHAN HYDROBROMIDE, GUAIFENESIN 5; 100 MG/5ML; MG/5ML
650 LIQUID ORAL EVERY 8 HOURS
Qty: 120 TABLET | Refills: 0 | Status: SHIPPED | OUTPATIENT
Start: 2025-05-05

## 2025-05-05 RX ORDER — OXYCODONE HYDROCHLORIDE 5 MG/1
TABLET ORAL
Qty: 50 TABLET | Refills: 0 | Status: SHIPPED | OUTPATIENT
Start: 2025-05-05

## 2025-05-05 RX ORDER — METHOCARBAMOL 750 MG/1
750 TABLET, FILM COATED ORAL 4 TIMES DAILY PRN
Qty: 40 TABLET | Refills: 0 | Status: SHIPPED | OUTPATIENT
Start: 2025-05-05 | End: 2025-05-16

## 2025-05-05 RX ORDER — CELECOXIB 200 MG/1
200 CAPSULE ORAL DAILY
Qty: 30 CAPSULE | Refills: 0 | Status: SHIPPED | OUTPATIENT
Start: 2025-05-05

## 2025-05-05 RX ORDER — PANTOPRAZOLE SODIUM 40 MG/1
40 TABLET, DELAYED RELEASE ORAL DAILY
Qty: 30 TABLET | Refills: 0 | Status: SHIPPED | OUTPATIENT
Start: 2025-05-05 | End: 2025-06-05

## 2025-05-05 NOTE — TELEPHONE ENCOUNTER
I called the patient today regarding surgery on 5/6/2025 with Dr. Babatunde Cotto. I informed the patient that her surgery will be at  [facility] ([address] [place] [address]). I informed the patient they must arrive at 0615 and their surgery will start at 0815.     Per the [facility] COVID-19 Pre-Procedural Testing Policy (administered 10/26/2022), patients do not need tested for COVID-19 regardless of vaccination status.    I reminded the patient that they cannot eat or drink after midnight, the night before surgery.      I reminded the patient to be careful of their skin over the next few days to make sure they do not get any cuts, scratches or scrapes.    The patient has not yet received their walker, bedside commode or shower chair from [facility]. I told the patient that she needs to call [facility] today at [phone].    The patient verbalized understanding and has no further questions.

## 2025-05-06 ENCOUNTER — ANESTHESIA (OUTPATIENT)
Dept: SURGERY | Facility: HOSPITAL | Age: 60
End: 2025-05-06
Payer: COMMERCIAL

## 2025-05-06 ENCOUNTER — HOSPITAL ENCOUNTER (OUTPATIENT)
Facility: HOSPITAL | Age: 60
Discharge: HOME | End: 2025-05-06
Attending: ORTHOPAEDIC SURGERY | Admitting: ORTHOPAEDIC SURGERY
Payer: COMMERCIAL

## 2025-05-06 VITALS
DIASTOLIC BLOOD PRESSURE: 58 MMHG | TEMPERATURE: 98 F | RESPIRATION RATE: 18 BRPM | SYSTOLIC BLOOD PRESSURE: 114 MMHG | HEART RATE: 62 BPM | OXYGEN SATURATION: 93 %

## 2025-05-06 DIAGNOSIS — M17.11 PRIMARY OSTEOARTHRITIS OF RIGHT KNEE: Primary | ICD-10-CM

## 2025-05-06 PROCEDURE — 97530 THERAPEUTIC ACTIVITIES: CPT

## 2025-05-06 PROCEDURE — 63600175 PHARM REV CODE 636 W HCPCS: Performed by: STUDENT IN AN ORGANIZED HEALTH CARE EDUCATION/TRAINING PROGRAM

## 2025-05-06 PROCEDURE — 63600175 PHARM REV CODE 636 W HCPCS: Performed by: NURSE ANESTHETIST, CERTIFIED REGISTERED

## 2025-05-06 PROCEDURE — 25000003 PHARM REV CODE 250: Performed by: NURSE ANESTHETIST, CERTIFIED REGISTERED

## 2025-05-06 PROCEDURE — 27100025 HC TUBING, SET FLUID WARMER: Performed by: ANESTHESIOLOGY

## 2025-05-06 PROCEDURE — 27201423 OPTIME MED/SURG SUP & DEVICES STERILE SUPPLY: Performed by: ORTHOPAEDIC SURGERY

## 2025-05-06 PROCEDURE — 97165 OT EVAL LOW COMPLEX 30 MIN: CPT

## 2025-05-06 PROCEDURE — 25000003 PHARM REV CODE 250

## 2025-05-06 PROCEDURE — 37000008 HC ANESTHESIA 1ST 15 MINUTES: Performed by: ORTHOPAEDIC SURGERY

## 2025-05-06 PROCEDURE — C1776 JOINT DEVICE (IMPLANTABLE): HCPCS | Performed by: ORTHOPAEDIC SURGERY

## 2025-05-06 PROCEDURE — 97162 PT EVAL MOD COMPLEX 30 MIN: CPT

## 2025-05-06 PROCEDURE — 63600175 PHARM REV CODE 636 W HCPCS

## 2025-05-06 PROCEDURE — 25000003 PHARM REV CODE 250: Performed by: ANESTHESIOLOGY

## 2025-05-06 PROCEDURE — 20985 CPTR-ASST DIR MS PX: CPT | Mod: ,,, | Performed by: ORTHOPAEDIC SURGERY

## 2025-05-06 PROCEDURE — 97116 GAIT TRAINING THERAPY: CPT

## 2025-05-06 PROCEDURE — 63600175 PHARM REV CODE 636 W HCPCS: Performed by: PHYSICIAN ASSISTANT

## 2025-05-06 PROCEDURE — 99900035 HC TECH TIME PER 15 MIN (STAT)

## 2025-05-06 PROCEDURE — 36000712 HC OR TIME LEV V 1ST 15 MIN: Performed by: ORTHOPAEDIC SURGERY

## 2025-05-06 PROCEDURE — 36000713 HC OR TIME LEV V EA ADD 15 MIN: Performed by: ORTHOPAEDIC SURGERY

## 2025-05-06 PROCEDURE — 71000016 HC POSTOP RECOV ADDL HR: Performed by: ORTHOPAEDIC SURGERY

## 2025-05-06 PROCEDURE — 63600175 PHARM REV CODE 636 W HCPCS: Performed by: ANESTHESIOLOGY

## 2025-05-06 PROCEDURE — 94761 N-INVAS EAR/PLS OXIMETRY MLT: CPT

## 2025-05-06 PROCEDURE — 71000033 HC RECOVERY, INTIAL HOUR: Performed by: ORTHOPAEDIC SURGERY

## 2025-05-06 PROCEDURE — C1713 ANCHOR/SCREW BN/BN,TIS/BN: HCPCS | Performed by: ORTHOPAEDIC SURGERY

## 2025-05-06 PROCEDURE — 71000015 HC POSTOP RECOV 1ST HR: Performed by: ORTHOPAEDIC SURGERY

## 2025-05-06 PROCEDURE — 71000039 HC RECOVERY, EACH ADD'L HOUR: Performed by: ORTHOPAEDIC SURGERY

## 2025-05-06 PROCEDURE — 27447 TOTAL KNEE ARTHROPLASTY: CPT | Mod: RT,,, | Performed by: ORTHOPAEDIC SURGERY

## 2025-05-06 PROCEDURE — 27000221 HC OXYGEN, UP TO 24 HOURS

## 2025-05-06 PROCEDURE — 97535 SELF CARE MNGMENT TRAINING: CPT

## 2025-05-06 PROCEDURE — 37000009 HC ANESTHESIA EA ADD 15 MINS: Performed by: ORTHOPAEDIC SURGERY

## 2025-05-06 DEVICE — TIBIAL BEARING INSERT - CS
Type: IMPLANTABLE DEVICE | Site: KNEE | Status: FUNCTIONAL
Brand: TRIATHLON

## 2025-05-06 DEVICE — CRUCIATE RETAINING FEMORAL
Type: IMPLANTABLE DEVICE | Site: KNEE | Status: FUNCTIONAL
Brand: TRIATHLON

## 2025-05-06 DEVICE — SIMPLEX® HV WITH GENTAMICIN IS A FAST-SETTING ACRYLIC RESIN WITH ADDITION OF GENTAMICIN SULFATE FOR USE IN BONE SURGERY. MIXING THE TWO SEPARATE STERILE COMPONENTS PRODUCES A DUCTILE BONE CEMENT WHICH, AFTER HARDENING, FIXES THE IMPLANT AND TRANSFERS STRESSES PRODUCED DURING MOVEMENT EVENLY TO THE BONE. SIMPLEX® HV WITH GENTAMICINN CEMENT POWDER ALSO CONTAINS INSOLUBLE ZIRCONIUM DIOXIDE AS AN X-RAY CONTRAST MEDIUM. SIMPLEX® HV WITH GENTAMICIN DOES NOT EMIT A SIGNAL AND DOES NOT POSE A SAFETY RISK IN A MAGNETIC RESONANCE ENVIRONMENT.
Type: IMPLANTABLE DEVICE | Site: KNEE | Status: FUNCTIONAL
Brand: SIMPLEX HV WITH GENTAMICIN

## 2025-05-06 DEVICE — PATELLA
Type: IMPLANTABLE DEVICE | Site: KNEE | Status: FUNCTIONAL
Brand: TRIATHLON

## 2025-05-06 DEVICE — PRIMARY TIBIAL BASEPLATE
Type: IMPLANTABLE DEVICE | Site: KNEE | Status: FUNCTIONAL
Brand: TRIATHLON

## 2025-05-06 RX ORDER — BISACODYL 10 MG/1
10 SUPPOSITORY RECTAL EVERY 12 HOURS PRN
Status: DISCONTINUED | OUTPATIENT
Start: 2025-05-06 | End: 2025-05-06 | Stop reason: HOSPADM

## 2025-05-06 RX ORDER — ACETAMINOPHEN 500 MG
1000 TABLET ORAL EVERY 6 HOURS
Status: DISCONTINUED | OUTPATIENT
Start: 2025-05-06 | End: 2025-05-06 | Stop reason: HOSPADM

## 2025-05-06 RX ORDER — FAMOTIDINE 20 MG/1
20 TABLET, FILM COATED ORAL 2 TIMES DAILY
Status: DISCONTINUED | OUTPATIENT
Start: 2025-05-06 | End: 2025-05-06 | Stop reason: HOSPADM

## 2025-05-06 RX ORDER — FENTANYL CITRATE 50 UG/ML
100 INJECTION, SOLUTION INTRAMUSCULAR; INTRAVENOUS
Status: DISCONTINUED | OUTPATIENT
Start: 2025-05-06 | End: 2025-05-06 | Stop reason: HOSPADM

## 2025-05-06 RX ORDER — MUPIROCIN 20 MG/G
1 OINTMENT TOPICAL
Status: COMPLETED | OUTPATIENT
Start: 2025-05-06 | End: 2025-05-06

## 2025-05-06 RX ORDER — SODIUM CHLORIDE 9 MG/ML
INJECTION, SOLUTION INTRAVENOUS
Status: COMPLETED | OUTPATIENT
Start: 2025-05-06 | End: 2025-05-06

## 2025-05-06 RX ORDER — MIDAZOLAM HYDROCHLORIDE 1 MG/ML
4 INJECTION, SOLUTION INTRAMUSCULAR; INTRAVENOUS
Status: DISCONTINUED | OUTPATIENT
Start: 2025-05-06 | End: 2025-05-06 | Stop reason: HOSPADM

## 2025-05-06 RX ORDER — ONDANSETRON HYDROCHLORIDE 2 MG/ML
INJECTION, SOLUTION INTRAVENOUS
Status: DISCONTINUED | OUTPATIENT
Start: 2025-05-06 | End: 2025-05-06

## 2025-05-06 RX ORDER — METHOCARBAMOL 750 MG/1
750 TABLET, FILM COATED ORAL 3 TIMES DAILY
Status: DISCONTINUED | OUTPATIENT
Start: 2025-05-06 | End: 2025-05-06 | Stop reason: HOSPADM

## 2025-05-06 RX ORDER — KETAMINE HCL IN 0.9 % NACL 50 MG/5 ML
SYRINGE (ML) INTRAVENOUS
Status: DISCONTINUED | OUTPATIENT
Start: 2025-05-06 | End: 2025-05-06

## 2025-05-06 RX ORDER — CEFAZOLIN SODIUM 1 G/3ML
INJECTION, POWDER, FOR SOLUTION INTRAMUSCULAR; INTRAVENOUS
Status: DISCONTINUED | OUTPATIENT
Start: 2025-05-06 | End: 2025-05-06

## 2025-05-06 RX ORDER — AMOXICILLIN 250 MG
1 CAPSULE ORAL 2 TIMES DAILY
Status: DISCONTINUED | OUTPATIENT
Start: 2025-05-06 | End: 2025-05-06 | Stop reason: HOSPADM

## 2025-05-06 RX ORDER — OXYCODONE HYDROCHLORIDE 5 MG/1
5 TABLET ORAL
Status: DISCONTINUED | OUTPATIENT
Start: 2025-05-06 | End: 2025-05-06 | Stop reason: HOSPADM

## 2025-05-06 RX ORDER — ONDANSETRON HYDROCHLORIDE 2 MG/ML
4 INJECTION, SOLUTION INTRAVENOUS EVERY 8 HOURS PRN
Status: DISCONTINUED | OUTPATIENT
Start: 2025-05-06 | End: 2025-05-06 | Stop reason: HOSPADM

## 2025-05-06 RX ORDER — SODIUM CHLORIDE 0.9 % (FLUSH) 0.9 %
10 SYRINGE (ML) INJECTION
Status: DISCONTINUED | OUTPATIENT
Start: 2025-05-06 | End: 2025-05-06 | Stop reason: HOSPADM

## 2025-05-06 RX ORDER — CETIRIZINE HYDROCHLORIDE 10 MG/1
10 TABLET ORAL DAILY
COMMUNITY

## 2025-05-06 RX ORDER — PHENYLEPHRINE HYDROCHLORIDE 10 MG/ML
INJECTION INTRAVENOUS
Status: DISCONTINUED | OUTPATIENT
Start: 2025-05-06 | End: 2025-05-06

## 2025-05-06 RX ORDER — PREGABALIN 75 MG/1
75 CAPSULE ORAL NIGHTLY
Status: DISCONTINUED | OUTPATIENT
Start: 2025-05-06 | End: 2025-05-06 | Stop reason: HOSPADM

## 2025-05-06 RX ORDER — ROPIVACAINE HYDROCHLORIDE 2 MG/ML
INJECTION, SOLUTION EPIDURAL; INFILTRATION; PERINEURAL CONTINUOUS
Status: DISCONTINUED | OUTPATIENT
Start: 2025-05-06 | End: 2025-05-06 | Stop reason: HOSPADM

## 2025-05-06 RX ORDER — ASPIRIN 81 MG/1
81 TABLET ORAL 2 TIMES DAILY
Status: DISCONTINUED | OUTPATIENT
Start: 2025-05-06 | End: 2025-05-06 | Stop reason: HOSPADM

## 2025-05-06 RX ORDER — PROPOFOL 10 MG/ML
VIAL (ML) INTRAVENOUS CONTINUOUS PRN
Status: DISCONTINUED | OUTPATIENT
Start: 2025-05-06 | End: 2025-05-06

## 2025-05-06 RX ORDER — NALOXONE HCL 0.4 MG/ML
0.02 VIAL (ML) INJECTION
Status: DISCONTINUED | OUTPATIENT
Start: 2025-05-06 | End: 2025-05-06 | Stop reason: HOSPADM

## 2025-05-06 RX ORDER — OXYCODONE HYDROCHLORIDE 10 MG/1
10 TABLET ORAL
Status: DISCONTINUED | OUTPATIENT
Start: 2025-05-06 | End: 2025-05-06 | Stop reason: HOSPADM

## 2025-05-06 RX ORDER — FENTANYL CITRATE 50 UG/ML
25 INJECTION, SOLUTION INTRAMUSCULAR; INTRAVENOUS EVERY 5 MIN PRN
Status: DISCONTINUED | OUTPATIENT
Start: 2025-05-06 | End: 2025-05-06 | Stop reason: HOSPADM

## 2025-05-06 RX ORDER — LIDOCAINE HYDROCHLORIDE 20 MG/ML
INJECTION INTRAVENOUS
Status: DISCONTINUED | OUTPATIENT
Start: 2025-05-06 | End: 2025-05-06

## 2025-05-06 RX ORDER — CEFAZOLIN 2 G/1
2 INJECTION, POWDER, FOR SOLUTION INTRAMUSCULAR; INTRAVENOUS
Status: DISCONTINUED | OUTPATIENT
Start: 2025-05-06 | End: 2025-05-06 | Stop reason: HOSPADM

## 2025-05-06 RX ORDER — PROPOFOL 10 MG/ML
VIAL (ML) INTRAVENOUS
Status: DISCONTINUED | OUTPATIENT
Start: 2025-05-06 | End: 2025-05-06

## 2025-05-06 RX ORDER — LIDOCAINE HYDROCHLORIDE 10 MG/ML
1 INJECTION, SOLUTION EPIDURAL; INFILTRATION; INTRACAUDAL; PERINEURAL
Status: DISCONTINUED | OUTPATIENT
Start: 2025-05-06 | End: 2025-05-06 | Stop reason: HOSPADM

## 2025-05-06 RX ORDER — FAMOTIDINE 10 MG/ML
INJECTION, SOLUTION INTRAVENOUS
Status: DISCONTINUED | OUTPATIENT
Start: 2025-05-06 | End: 2025-05-06

## 2025-05-06 RX ORDER — PROCHLORPERAZINE EDISYLATE 5 MG/ML
5 INJECTION INTRAMUSCULAR; INTRAVENOUS EVERY 6 HOURS PRN
Status: DISCONTINUED | OUTPATIENT
Start: 2025-05-06 | End: 2025-05-06 | Stop reason: HOSPADM

## 2025-05-06 RX ORDER — ACETAMINOPHEN 500 MG
1000 TABLET ORAL
Status: COMPLETED | OUTPATIENT
Start: 2025-05-06 | End: 2025-05-06

## 2025-05-06 RX ORDER — METHOCARBAMOL 500 MG/1
1000 TABLET, FILM COATED ORAL ONCE AS NEEDED
Status: COMPLETED | OUTPATIENT
Start: 2025-05-06 | End: 2025-05-06

## 2025-05-06 RX ORDER — SODIUM CHLORIDE 9 MG/ML
INJECTION, SOLUTION INTRAVENOUS CONTINUOUS
Status: DISCONTINUED | OUTPATIENT
Start: 2025-05-06 | End: 2025-05-06 | Stop reason: HOSPADM

## 2025-05-06 RX ORDER — EPHEDRINE SULFATE 50 MG/ML
INJECTION, SOLUTION INTRAVENOUS
Status: DISCONTINUED | OUTPATIENT
Start: 2025-05-06 | End: 2025-05-06

## 2025-05-06 RX ORDER — ONDANSETRON HYDROCHLORIDE 2 MG/ML
4 INJECTION, SOLUTION INTRAVENOUS ONCE AS NEEDED
Status: DISCONTINUED | OUTPATIENT
Start: 2025-05-06 | End: 2025-05-06 | Stop reason: HOSPADM

## 2025-05-06 RX ORDER — POLYETHYLENE GLYCOL 3350 17 G/17G
17 POWDER, FOR SOLUTION ORAL DAILY
Status: DISCONTINUED | OUTPATIENT
Start: 2025-05-06 | End: 2025-05-06 | Stop reason: HOSPADM

## 2025-05-06 RX ORDER — DEXAMETHASONE SODIUM PHOSPHATE 4 MG/ML
INJECTION, SOLUTION INTRA-ARTICULAR; INTRALESIONAL; INTRAMUSCULAR; INTRAVENOUS; SOFT TISSUE
Status: DISCONTINUED | OUTPATIENT
Start: 2025-05-06 | End: 2025-05-06

## 2025-05-06 RX ORDER — FENTANYL CITRATE 50 UG/ML
INJECTION, SOLUTION INTRAMUSCULAR; INTRAVENOUS
Status: DISCONTINUED | OUTPATIENT
Start: 2025-05-06 | End: 2025-05-06

## 2025-05-06 RX ORDER — PREGABALIN 75 MG/1
75 CAPSULE ORAL
Status: COMPLETED | OUTPATIENT
Start: 2025-05-06 | End: 2025-05-06

## 2025-05-06 RX ORDER — SODIUM CHLORIDE 0.9 % (FLUSH) 0.9 %
3 SYRINGE (ML) INJECTION
Status: DISCONTINUED | OUTPATIENT
Start: 2025-05-06 | End: 2025-05-06 | Stop reason: HOSPADM

## 2025-05-06 RX ORDER — TRANEXAMIC ACID 100 MG/ML
INJECTION, SOLUTION INTRAVENOUS
Status: DISCONTINUED | OUTPATIENT
Start: 2025-05-06 | End: 2025-05-06

## 2025-05-06 RX ORDER — CELECOXIB 200 MG/1
400 CAPSULE ORAL ONCE
Status: COMPLETED | OUTPATIENT
Start: 2025-05-06 | End: 2025-05-06

## 2025-05-06 RX ORDER — POVIDONE-IODINE 5 %
SOLUTION, NON-ORAL OPHTHALMIC (EYE)
Status: DISCONTINUED | OUTPATIENT
Start: 2025-05-06 | End: 2025-05-06 | Stop reason: HOSPADM

## 2025-05-06 RX ADMIN — PHENYLEPHRINE HYDROCHLORIDE 50 MCG: 10 INJECTION INTRAVENOUS at 08:05

## 2025-05-06 RX ADMIN — OXYCODONE 5 MG: 5 TABLET ORAL at 10:05

## 2025-05-06 RX ADMIN — LIDOCAINE HYDROCHLORIDE 50 MG: 20 INJECTION INTRAVENOUS at 08:05

## 2025-05-06 RX ADMIN — EPHEDRINE SULFATE 10 MG: 50 INJECTION INTRAVENOUS at 09:05

## 2025-05-06 RX ADMIN — FENTANYL CITRATE 25 MCG: 50 INJECTION INTRAMUSCULAR; INTRAVENOUS at 12:05

## 2025-05-06 RX ADMIN — VANCOMYCIN HYDROCHLORIDE 1500 MG: 1.5 INJECTION, POWDER, LYOPHILIZED, FOR SOLUTION INTRAVENOUS at 07:05

## 2025-05-06 RX ADMIN — PHENYLEPHRINE HYDROCHLORIDE 100 MCG: 10 INJECTION INTRAVENOUS at 09:05

## 2025-05-06 RX ADMIN — PHENYLEPHRINE HYDROCHLORIDE 100 MCG: 10 INJECTION INTRAVENOUS at 08:05

## 2025-05-06 RX ADMIN — METHOCARBAMOL 1000 MG: 500 TABLET ORAL at 10:05

## 2025-05-06 RX ADMIN — TRANEXAMIC ACID 1000 MG: 100 INJECTION INTRAVENOUS at 08:05

## 2025-05-06 RX ADMIN — PHENYLEPHRINE HYDROCHLORIDE 200 MCG: 10 INJECTION INTRAVENOUS at 10:05

## 2025-05-06 RX ADMIN — PROPOFOL 100 MCG/KG/MIN: 10 INJECTION, EMULSION INTRAVENOUS at 08:05

## 2025-05-06 RX ADMIN — SODIUM CHLORIDE: 0.9 INJECTION, SOLUTION INTRAVENOUS at 08:05

## 2025-05-06 RX ADMIN — ACETAMINOPHEN 1000 MG: 500 TABLET ORAL at 12:05

## 2025-05-06 RX ADMIN — MEPIVACAINE HYDROCHLORIDE 3.2 ML: 15 INJECTION, SOLUTION EPIDURAL; INFILTRATION at 08:05

## 2025-05-06 RX ADMIN — ONDANSETRON 4 MG: 2 INJECTION INTRAMUSCULAR; INTRAVENOUS at 08:05

## 2025-05-06 RX ADMIN — FENTANYL CITRATE 50 MCG: 50 INJECTION, SOLUTION INTRAMUSCULAR; INTRAVENOUS at 08:05

## 2025-05-06 RX ADMIN — Medication 25 MG: at 08:05

## 2025-05-06 RX ADMIN — CELECOXIB 400 MG: 200 CAPSULE ORAL at 07:05

## 2025-05-06 RX ADMIN — ACETAMINOPHEN 1000 MG: 500 TABLET ORAL at 07:05

## 2025-05-06 RX ADMIN — SODIUM CHLORIDE: 9 INJECTION, SOLUTION INTRAVENOUS at 07:05

## 2025-05-06 RX ADMIN — PREGABALIN 75 MG: 75 CAPSULE ORAL at 07:05

## 2025-05-06 RX ADMIN — DEXAMETHASONE SODIUM PHOSPHATE 8 MG: 4 INJECTION, SOLUTION INTRAMUSCULAR; INTRAVENOUS at 08:05

## 2025-05-06 RX ADMIN — FENTANYL CITRATE 50 MCG: 50 INJECTION INTRAMUSCULAR; INTRAVENOUS at 07:05

## 2025-05-06 RX ADMIN — MIDAZOLAM HYDROCHLORIDE 2 MG: 1 INJECTION, SOLUTION INTRAMUSCULAR; INTRAVENOUS at 07:05

## 2025-05-06 RX ADMIN — FAMOTIDINE 20 MG: 10 INJECTION, SOLUTION INTRAVENOUS at 08:05

## 2025-05-06 RX ADMIN — MUPIROCIN 1 G: 20 OINTMENT TOPICAL at 07:05

## 2025-05-06 RX ADMIN — PHENYLEPHRINE HYDROCHLORIDE 200 MCG: 10 INJECTION INTRAVENOUS at 09:05

## 2025-05-06 RX ADMIN — Medication: at 10:05

## 2025-05-06 RX ADMIN — TRANEXAMIC ACID 1000 MG: 100 INJECTION INTRAVENOUS at 09:05

## 2025-05-06 RX ADMIN — CEFAZOLIN 2 G: 330 INJECTION, POWDER, FOR SOLUTION INTRAMUSCULAR; INTRAVENOUS at 08:05

## 2025-05-06 RX ADMIN — SODIUM CHLORIDE, SODIUM GLUCONATE, SODIUM ACETATE, POTASSIUM CHLORIDE, MAGNESIUM CHLORIDE, SODIUM PHOSPHATE, DIBASIC, AND POTASSIUM PHOSPHATE: .53; .5; .37; .037; .03; .012; .00082 INJECTION, SOLUTION INTRAVENOUS at 09:05

## 2025-05-06 RX ADMIN — PROPOFOL 50 MG: 10 INJECTION, EMULSION INTRAVENOUS at 08:05

## 2025-05-06 RX ADMIN — SODIUM CHLORIDE: 9 INJECTION, SOLUTION INTRAVENOUS at 01:05

## 2025-05-06 NOTE — ANESTHESIA PREPROCEDURE EVALUATION
2025  Pre-operative evaluation for Procedure(s) (LRB):  ARTHROPLASTY, KNEE, TOTAL, USING COMPUTER-ASSISTED NAVIGATION: RIGHT: SAME DAY (Right)    Erin Johnson is a 60 y.o. female     Problem List[1]    Review of patient's allergies indicates:  No Known Allergies    Medications Ordered Prior to Encounter[2]    Past Surgical History:   Procedure Laterality Date    COLONOSCOPY, SCREENING, LOW RISK PATIENT N/A 2025    Procedure: COLONOSCOPY, SCREENING, LOW RISK PATIENT;  Surgeon: Rafael Rai MD;  Location: [address]);  Service: Endoscopy;  Laterality: N/A;   ref by Dr. Yefri Avilez, Peg, portal. kristina  -precall complete-ES    REPAIR OF MENISCUS OF KNEE Right        Social History[3]    EKD Echo:  No results found for this or any previous visit.        Pre-op Assessment    I have reviewed the Patient Summary Reports.     I have reviewed the Nursing Notes. I have reviewed the NPO Status.   I have reviewed the Medications.     Review of Systems  Anesthesia Hx:             Denies Family Hx of Anesthesia complications.    Denies Personal Hx of Anesthesia complications.                    Cardiovascular:  Exercise tolerance: good        Denies Dysrhythmias.   Denies Angina.     hyperlipidemia  Denies CASTILLO.                              Pulmonary:    Denies COPD.  Denies Asthma.                    Renal/:   Denies Chronic Renal Disease.                Hepatic/GI:      Denies GERD.                Musculoskeletal:  Arthritis               Neurological:    Denies CVA.    Denies Seizures.                                Endocrine:  Denies Diabetes.               Physical Exam  General: Well nourished, Cooperative, Alert and Oriented    Airway:  Mallampati: II / I  Mouth Opening: Normal  TM Distance: Normal  Tongue: Normal  Neck ROM: Normal  ROM    Dental:  Intact    Chest/Lungs:  Clear to auscultation, Normal Respiratory Rate    Heart:  Rate: Normal  Rhythm: Regular Rhythm        Anesthesia Plan  Type of Anesthesia, risks & benefits discussed:    Anesthesia Type: Gen Natural Airway, Regional, Spinal  Intra-op Monitoring Plan: Standard ASA Monitors  Post Op Pain Control Plan: multimodal analgesia and peripheral nerve block  Induction:  IV  Informed Consent: Informed consent signed with the Patient and all parties understand the risks and agree with anesthesia plan.  All questions answered. Patient consented to blood products? Yes  ASA Score: 2  Day of Surgery Review of History & Physical: H&P Update referred to the surgeon/provider.    Ready For Surgery From Anesthesia Perspective.     .           [1]   Patient Active Problem List  Diagnosis    Primary osteoarthritis of right knee    Difficulty walking    HIV (human immunodeficiency virus infection)    Hyperlipidemia    Current occasional smoker    Snoring    Obesity with body mass index (BMI) of 30.0 to 39.9   [2]   No current facility-administered medications on file prior to encounter.     Current Outpatient Medications on File Prior to Encounter   Medication Sig Dispense Refill    cetirizine (ZYRTEC) 10 MG tablet Take 10 mg by mouth once daily.      DOVATO  mg Tab Take 1 tablet by mouth Daily. 90 tablet 1    ibuprofen (ADVIL,MOTRIN) 800 MG tablet Take 1 tablet (800 mg total) by mouth 3 (three) times daily as needed for Pain. 30 tablet 0    pravastatin (PRAVACHOL) 10 MG tablet Take 1 tablet (10 mg total) by mouth once daily. 90 tablet 3    triazolam 0.25 MG tablet Take by mouth.     [3]   Social History  Socioeconomic History    Marital status: Single   Tobacco Use    Smoking status: Some Days     Types: Cigarettes    Smokeless tobacco: Never   Substance and Sexual Activity    Alcohol use: Yes     Comment: 3-4 drinks weekly    Drug use: Never    Sexual activity: Not Currently     Social Drivers  of Health     Financial Resource Strain: Low Risk  (1/24/2025)    Overall Financial Resource Strain (CARDIA)     Difficulty of Paying Living Expenses: Not very hard   Food Insecurity: No Food Insecurity (1/24/2025)    Hunger Vital Sign     Worried About Running Out of Food in the Last Year: Never true     Ran Out of Food in the Last Year: Never true   Transportation Needs: No Transportation Needs (1/3/2024)    PRAPARE - Transportation     Lack of Transportation (Medical): No     Lack of Transportation (Non-Medical): No   Physical Activity: Inactive (1/24/2025)    Exercise Vital Sign     Days of Exercise per Week: 0 days     Minutes of Exercise per Session: 40 min   Stress: Stress Concern Present (1/24/2025)    [employer] - Occupational Stress Questionnaire     Feeling of Stress : To some extent   Housing Stability: Low Risk  (1/3/2024)    Housing Stability Vital Sign     Unable to Pay for Housing in the Last Year: No     Number of Places Lived in the Last Year: 2     Unstable Housing in the Last Year: No

## 2025-05-06 NOTE — DISCHARGE SUMMARY
[facility])  Orthopedics  Discharge Summary      Patient Name: Erin Johnson  MRN: [id number]  Admission Date: 5/6/2025  Hospital Length of Stay: 0 days  Discharge Date and Time: 5/6/2025  4:16 PM  Attending Physician: Clemencia att. providers found   Discharging Provider: Rahul Solis MD  Primary Care Provider: Clemencia, Primary Doctor    HPI:   Erin Johnson is a 59 y.o. female with history of Right knee pain. Pain is worse with activity and weight bearing.  Patient has experienced interference of activities of daily living due to decreased range of motion and an increase in joint pain and swelling.  Patient has failed non-operative treatment including NSAIDs, corticosteroid injections, viscosupplement injections, and activity modification.  Erin Johnson currently ambulates independently.      Relevant medical conditions of significance in perioperative period:  HLD- on medication managed by PCP  HIV- on medication managed by infectious disease  Current occasional smoker- reports last cigarette was 6 months ago      Given documented medical comorbidities including those listed above and based off of CMS criteria patient would meet inpatient admission status guidelines. This case has been approved as inpatient.    Procedure(s) (LRB):  ARTHROPLASTY, KNEE, TOTAL, USING COMPUTER-ASSISTED NAVIGATION: RIGHT: SAME DAY (Right)      Hospital Course:  Patient presented for above procedure.  Tolerated it well and was discharged home POD0 after voiding, tolerating diet, ambulating, pain controlled.  Discharge instructions, follow-up appointment, and med rec are below.      Goals of Care Treatment Preferences:             Significant Diagnostic Studies: Labs: All labs within the past 24 hours have been reviewed    Pending Diagnostic Studies:       None          Final Active Diagnoses:    Diagnosis Date Noted POA    PRINCIPAL PROBLEM:  Primary osteoarthritis of right knee [M17.11] 04/01/2025 Yes      Problems Resolved  "During this Admission:      Discharged Condition: good    Disposition: Home or Self Care    Follow Up:   Follow-up Information       Babatunde Cotto MD Follow up in 2 week(s).    Specialty: Orthopedic Surgery  Why: For wound re-check  Contact information:  [address]  [facility] [address]  [phone]                           Patient Instructions:      WALKER FOR HOME USE     Order Specific Question Answer Comments   Type of Walker: Adult (5'4"-6'6")    With wheels? Yes    Height: 5'7    Weight: 88 kg    Length of need (1-99 months): 99    Please check all that apply: Patient's condition impairs ambulation.    Please check all that apply: Patient needs help to get in and out of chair.    Please check all that apply: Walker will be used for gait training.    Please check all that apply: Altered sensory perception.    Please check all that apply: Patient is unable to safely ambulate without equipment.      Activity as tolerated     Sponge bath only until clinic visit     Keep surgical extremity elevated when not ambulating     Lifting restrictions   Order Comments: No strenuous exercise or lifting of > 10 lbs     Weight bearing as tolerated     No driving, operating heavy equipment or signing legal documents while taking pain medication     Leave dressing on - Keep it clean, dry, and intact until clinic visit   Order Comments: Do not remove surgical dressing for 2 weeks post-op. This will be done only by MD at initial post-op visit. If dressing is completely saturated, replace with identical dressing - silver-impregnated hydrocolloid dressing.     Do not get dressings wet. Do not shower.     If dressing continues to be saturated or there are signs of infection, please call [facility] [phone] for further instructions and to make appt to be seen.     On POD1 remove ace wrap and cotton padding. Leave Aquacel bandage on until 2  week post op visit in clinic     Call MD for:  temperature >100.4 "     Call MD for:  persistent nausea and vomiting     Call MD for:  severe uncontrolled pain     Call MD for:  difficulty breathing, headache or visual disturbances     Call MD for:  redness, tenderness, or signs of infection (pain, swelling, redness, odor or green/yellow discharge around incision site)     Call MD for:  hives     Call MD for:  persistent dizziness or light-headedness     Call MD for:  extreme fatigue     Notify your health care provider if you experience any of the following:  temperature >100.4     Notify your health care provider if you experience any of the following:  persistent nausea and vomiting or diarrhea     Notify your health care provider if you experience any of the following:  severe uncontrolled pain     Notify your health care provider if you experience any of the following:  redness, tenderness, or signs of infection (pain, swelling, redness, odor or green/yellow discharge around incision site)     Notify your health care provider if you experience any of the following:  difficulty breathing or increased cough     Notify your health care provider if you experience any of the following:  severe persistent headache     Notify your health care provider if you experience any of the following:  worsening rash     Notify your health care provider if you experience any of the following:  persistent dizziness, light-headedness, or visual disturbances     Notify your health care provider if you experience any of the following:  increased confusion or weakness     Leave dressing on - Keep it clean, dry, and intact until clinic visit     Activity as tolerated     Medications:  Reconciled Home Medications:      Medication List        CONTINUE taking these medications      acetaminophen 650 MG Tbsr  Commonly known as: TYLENOL  Take 1 tablet (650 mg total) by mouth every 8 (eight) hours.     aspirin 81 MG EC tablet  Commonly known as: ECOTRIN  Take 1 tablet (81 mg total) by mouth 2 (two) times a  day.     celecoxib 200 MG capsule  Commonly known as: CeleBREX  Take 1 capsule (200 mg total) by mouth once daily.     cetirizine 10 MG tablet  Commonly known as: ZYRTEC  Take 10 mg by mouth once daily.     DOVATO  mg Tab  Generic drug: dolutegravir-lamivudine  Take 1 tablet by mouth Daily.     ibuprofen 800 MG tablet  Commonly known as: ADVIL,MOTRIN  Take 1 tablet (800 mg total) by mouth 3 (three) times daily as needed for Pain.     methocarbamoL 750 MG Tab  Commonly known as: ROBAXIN  Take 1 tablet (750 mg total) by mouth 4 (four) times daily as needed (muscle spasms).     oxyCODONE 5 MG immediate release tablet  Commonly known as: ROXICODONE  Take 1-2 tablets every 4-6 hours as needed for pain     pantoprazole 40 MG tablet  Commonly known as: PROTONIX  Take 1 tablet (40 mg total) by mouth once daily.     polyethylene glycol 17 gram/dose powder  Commonly known as: GLYCOLAX  Take 17 g by mouth once daily.     pravastatin 10 MG tablet  Commonly known as: PRAVACHOL  Take 1 tablet (10 mg total) by mouth once daily.     triazolam 0.25 MG tablet  Take by mouth.              Rahul Solis MD  Orthopedics  [facility] \[facility] [place] [facility]\"")

## 2025-05-06 NOTE — OP NOTE
DATE OF PROCEDURE:  5/6/25.     PREOPERATIVE DIAGNOSIS:  Arthritis, right knee.     POSTOPERATIVE DIAGNOSIS:  Arthritis, right knee.     PROCEDURES PERFORMED:  Robotically-assisted right total knee arthroplasty.     SURGEON:  Babatunde Cotto M.D.     ASSISTANT:  TAURUS Solis MD/ TAURUS Delacruz     ANESTHESIA:  Regional.     COMPLICATIONS:  None.     COUNTS:  Correct.     DISPOSITION:  Recovery Room, stable.     SPECIMENS:  Bone and cartilage.     FINDINGS:  Arthritis.     FLUIDS:  2000 mL.     ESTIMATED BLOOD LOSS:  <50cc     IMPLANTS:  Ly Triathlon size 3 right  Triathlon cruciate retaining femoral component and a size 4 primary tibial baseplate, 29 mm patella and a size 4, 10 mm   CS tibial insert.     INDICATIONS FOR PROCEDURE:   Erin Johnson  is a 60-year-old female who is   having symptoms of right knee pain.  Physical examination and imaging studies   were consistent with arthritis.Treatment options were explained and it was decided   to proceed with robotically-assisted right total knee arthroplasty.  She was   aware of reasonable treatment options as well as risks and benefits.       PROCEDURE IN DETAIL:  After appropriate consent was obtained, the patient   brought in the Operating Room, anesthesia was administered.  She received   antibiotic prophylaxis.  Cast padding and tourniquet was applied to the proximal  right thigh.  right lower extremity was then prepped and draped in usual sterile   fashion.  The leg herrera was applied.  Timeout was called.  Limb was elevated   and tourniquet was inflated.  The knee was flexed.  An incision was made from   the tibial tubercle just proximal to the superior pole of the patella.  It was   taken down through the skin and retinacular.  A medial parapatellar arthrotomy   was performed followed by a medial release.  Following this ACL was resected, fat pad   was excised.  The patella was everted.  It was measured and found to be 22 mm,  8 mm of bone removed and the 3  peg holes were drilled for the 29 mm patella.    Following this, 2 stab incisions were made 4 fingerbreadths below the tibial   tubercle on the medial aspect of the tibial crest.  The 2 guide pins were placed   along with the fixation device through these.  The array was applied and   tightened to the clamp. We checked the security of the array and it was fine. Following this, we placed the femoral pins 1 cm superior and anterior to the MCL insertion.  The check point was placed in between the pins. The guide clamp and array were secured..  Once this was accomplished, the hip center was obtained, the   medial and lateral malleoli were demarcated.  The femoral check point and tibial   check point were placed and the femur and tibia were then registered.    Acceptable registration was obtained.  Osteophytes were removed. We then captured poses in extension,   And approximately 90 degrees of flexion.  Initial alignment was 1.5 degrees varus and 3  Degrees extension .  The  initial gaps were   then obtained. The extension gaps were 19.5 medially and 16.5 laterally.  The flexion gaps were 15.5 medially and 16 laterally.  Component   position was adjusted.  We were very satisfied with the component position and   sizing.  We had a size 3 femur and a 4 tibia.  Once this was accomplished, the   robotic arm was brought in and our cuts were made.  The tibia was cut 1st.  We then cut the anterior femur anterior chamfer and posterior femur.  The saw blade was then switched and we made our distal and posterior chamfer cut.  We were very satisfied all the cuts.  Bone fragments were removed.  Lamina  was used to open up posteriorly and we removed any remaining osteophytes and meniscal remnants. The PCL was intact and   robust.  We placed the tibial trial.  We had good coverage with this.  We used   the medial one-third of the tibial tubercle to guide rotation and the trial was pinned in   place.  A 10 mm insert was placed  and then the femoral component was placed.    This was centered on the femur and the knee was brought through a range of   motion.  She was balanced in flexion with 18 mm gap medially and a 18.5 mm  laterally.  In extension, there was an 18 mm gap medially 18 mm gap laterally.    Clinically,there was excellent balance and stability.  Final alignment was 1 degree extension and 0 degrees varus. Trial   patellar button was placed.  Patella tracked well.  Therefore, at this point, we   were satisfied with knee range of motion and stability, component position,   sizing and alignment as well as patella tracking.  It should be noted that she  had full extension and he had at least 130 degrees of flexion and there was no   instability at full extension, midflexion, or deep flexion.  Trial components   were removed.  Arrays and femoral and tibial pins were removed. The bone was then prepared for cementing for pulsatile lavage and   drying. Components were then cemented into   place. Tibia followed by femur.  Cement was applied to the tibial keel as   well.  Excess cement was removed.  The tibial insert was firmly seated.  The knee was inspected.  There was no loose body, foreign body, or soft tissue interposition.  It was   then reduced.  Patella button was cemented in place.  Once the cement was dry,   the knee was irrigated with Betadine solution.  Following this, it was irrigated   with pulsatile lavage.  This was periodically done throughout the closure.  The   incision was then closed.  The arthrotomy was closed with #1 Vicryl.  Once the   arthrotomy was closed, the knee was brought through range of motion and was   stable as previously described and the patella tracked well.  The remainder of   the incision was closed with 0 Vicryl, 2-0 Vicryl, Monocryl, and Dermabond.    The stab incisions were closed with Vicryl and Dermabond.  It should be noted   that all check points were removed as well as the femoral and tibial  pins.    Sterile dressing was applied.  She was transferred from the Operating Room table   to a stretcher, brought to Recovery Room in stable condition.  She tolerated the   procedure well and there were no known complications. A gram of IV tranexamic acid was received prior to incision and at closure.

## 2025-05-06 NOTE — HOSPITAL COURSE
Patient presented for above procedure.  Tolerated it well and was discharged home POD0 after voiding, tolerating diet, ambulating, pain controlled.  Discharge instructions, follow-up appointment, and med rec are below.

## 2025-05-06 NOTE — PT/OT/SLP EVAL
Physical Therapy Evaluation and Treatment- BID Treatment Sessions  PM Session/Discharge Summary    Patient Name: Erin Johnson   MRN: [id number]  Recent Surgery: Procedure(s) (LRB):  ARTHROPLASTY, KNEE, TOTAL, USING COMPUTER-ASSISTED NAVIGATION: RIGHT: SAME DAY (Right) * Day of Surgery *    Recommendations:     Discharge Recommendations: Low Intensity Therapy   Discharge Equipment Recommendations: walker, rolling, bath bench, bedside commode   Barriers to discharge: None    Assessment:     Erin Johsnon is a 60 y.o. female admitted with a medical diagnosis of OA R knee She presents with the following impairments/functional limitations: weakness, impaired endurance, impaired self care skills, impaired functional mobility, gait instability, impaired balance, decreased lower extremity function, pain, decreased ROM, edema, impaired cardiopulmonary response to activity. Pt presents s/p R TKA with expected post-op deficits.  Pt did fair with PT today at first session and was only able to amb 20' with RW and CGA before she became dizzy and weak and had to sit in the w/c.  Her BP was stable from sup to sit but she decreased to 99/54 during gait and had to return to room in the w/c to receive IV fluid bolus.      In the PM 2nd session pt did much better after her fluid bolus and she was able to amb 50' and then 20' with RW and CGA and then 40' with RW and SBA.     She ascended/descended 4 steps with R railing and Min assist for 1st trial and then she performed the stairs a 2nd trial - ascended/descended 4 steps with R railing and CGA with less difficulty.  Pt had increase anxiety about performing stairs during 1st trial and required increase encouragement and instruction. She did much better for 2nd trial and required cues only for sequencing.    Pt is ready for d/c home today from PT standpoint with her sister  to assist PRN and  will benefit from OPPT for cont PT to maximize  functional recovery, strength and ROM.   "    Rehab Prognosis: Good; patient would benefit from cont PT services post d/c to address these deficits and reach maximum level of function.    Plan:     During this hospitalization, patient to be seen daily to address the above listed problems via gait training, therapeutic activities, therapeutic exercises      Plan of Care Expires: 06/05/25    Subjective     Chief Complaint: " I am in a lot of discomfort"  During she stated " Can I sit down I am feeling dizzy and weak?"  During 2nd session, pt reported that she felt much better and had no dizziness or weak feelings during session.  " I am passing PT today and going home"  Patient Comments/Goals: to do everything without pain- walking, hiking,   Pain/Comfort: 1st and 2nd sesssion  Pain Rating 1: 7/10  Location - Side 1: Right  Location - Orientation 1: generalized  Location 1: knee  Pain Addressed 1: Pre-medicate for activity, Reposition, Distraction  Pain Rating Post-Intervention 1: 7/10    Social History:  Living Environment: Patient lives alone in a single story home with number of outside stair(s): 6 LORAINE with B rails too wide to reach more than 1 at a time and tub-shower combo  Pt's sister will be staying with her for 2 wks and then a friend and pt's son will be coming to stay with pt.  Prior Level of Function: Prior to admission, patient was independent,  +driving  Equipment Used at Home: none  DME owned (not currently used): rolling walker- issued today  Assistance Upon Discharge: family    Objective:     Communicated with RN prior to session. Patient found HOB elevated with telemetry, perineural catheter, peripheral IV, blood pressure cuff, PureWick, pulse ox (continuous), cryotherapy, FCD upon PT entry to room.  Pt's sister present in room.   Pt had R LE with hip ER and knee flexed ~15 degrees. PT ed pt on proper positioning of R LE in full ext post op and importance of full knee ext for functional tasks.    General Precautions: Standard, fall "   Orthopedic Precautions: RLE weight bearing as tolerated   Braces: N/A    Respiratory Status: Room air    Exams:  RLE ROM: WFL except decrease knee flex/ext due to pain and post op status.  RLE Strength: at least 3/5  LLE ROM: WNL  LLE Strength: WNL  Cognitive: Patient is oriented to Person, Place, Time, Situation  Gross Motor Coordination: WFL  Postural Exam: Patient presented with the following abnormalities:    -       Rounded shoulders  -       Forward head  Sensation:    -       Intact    Functional Mobility: 1st Session  Gait belt applied - Yes  Bed Mobility  Supine to Sit: stand by assistance for LE management, trunk management, and cues for technique with increase time for task.  Transfers  Sit to Stand: contact guard assistance with rolling walker and with cues for hand placement and foot placement  Gait  Patient ambulated 20' with rolling walker and contact guard assistance. Patient required cues for heel strike, position in walker, sequencing, upright posture, weight bearing status, and to WB thru R LE to increase independence and safety. Patient required cues ~ 75% of the time.  Pt attempting to amb with NWB R LE and required repeated cues for first 10' of gait for proper heel strike with heel-toe walking pattern and WBAT R LE  Pt amb with step-to gait pattern  Gait Deviations:  unsteady gait, decreased step length, narrow base of support, decreased weight shift, decreased foot clearance, decreased pio, inconsistent right foot placement, and antalgic gait      Balance  Sitting: GOOD: Maintains balance through MODERATE excursions of active trunk movement  Standing: FAIR: Needs CONTACT GUARD during gait      Functional Mobility: 2nd  Session  Gait belt applied - Yes  Transfers  Sit to Stand: contact guard assistance with rolling walker and with cues for hand placement and foot placement- for 1st and 2nd trial.  Sit to stand: Stand by assistance with rolling walker and cues for hand placement for 3rd  trial.  Gait  Patient ambulated 50' and then 20' with rolling walker and contact guard assistance.   For 3rd trial, pt amb 30' x 1 with rolling walker and stand by assistance.  Patient required cues for heel strike, position in walker, sequencing, upright posture, weight bearing status, and to WB thru R LE to increase independence and safety. Patient required cues ~ 25 of the time.  Pt amb with step-to gait pattern  Gait Deviations:  unsteady gait, decreased step length, narrow base of support, decreased weight shift, decreased foot clearance, decreased pio, inconsistent right foot placement, and antalgic gait      Balance  Sitting: GOOD: Maintains balance through MODERATE excursions of active trunk movement  Standing: FAIR: Needs CONTACT GUARD during gait  Stairs 1st trial: Pt ascended/descended 4 stair(s) with No Assistive Device with right handrail with Minimal Assistance.   Stairs 2nd trial: Pt ascended/descended 4 stair(s) with No Assistive Device with right handrail with Contract Guard Assistance.   PT demonstrated proper sequencing and technique for stair training education prior to pt performing stair training with PT.    Therapeutic Activities and Exercises: 1st Session  Patient educated on role of acute care PT and PT POC, safety while in hospital including calling nurse for mobility, and call light usage.  Educated about weightbearing as reno, and proper gait mechanics, and provided cuing for adherence as appropriate during session.  Educated about importance of OOB mobility and remaining up in chair most of the day.  PT instructed pt on therapeutic ex's for TKA HEP (QS, AP, GS, HS, Hip abd, SLR, SAQ, LAQ) x 10 reps x 3 sets each for muscle strengthening, endurance and increase knee ROM. Pt demonstrated good understanding back to PT. Patient required skilled PT for instruction of exercises and appropriate cues to perform exercises safely and appropriately.  Issued written handout of TKA HEP and  reviewed with pt.  PT ed pt on importance of elevating  LE above level of her heart 3-4 times a day and proper placement of pillows to keep knee extended and not flexed.  Pt and her caregiver verbalized good understanding back to PT.  PT demonstrated for pt with pink heel cushion under R heel to position R knee in ext.   PT ed pt on mobility expectations at home after d/c and she verbalized good understanding back to PT  PT ed pt on use of cryotherapy for edema and pain control, and on safety awareness with use of RW in the home and pt verbalized good understanding back to PT.   VS monitored during session:  Supine /62  MAP 84  Sitting /64   MAP 87  During Gait  99/54  MAP 74  RN notified upon return of pt to the room.    Therapeutic Activities and Exercises: 2nd Session   Patient educated on role of acute care PT and PT POC, safety while in hospital including calling nurse for mobility, and call light usage.  Educated about weightbearing as reno, and proper gait mechanics, and provided cuing for adherence as appropriate during session.  Educated about importance of OOB mobility and remaining up in chair most of the day.  PT ed pt on mobility expectations at home after d/c and she verbalized good understanding back to PT  PT ed pt and her sister on proper stair sequencing and technique with demonstration and instruction and they demonstrated good understanding back to PT.  Gait training as above.    AM-PAC 6 CLICK MOBILITY  Total Score:18    Patient left up in chair with all lines intact, call button in reach, RN notified, and RN and family present.    GOALS:   Multidisciplinary Problems       Physical Therapy Goals - the below goals were met at 2nd session of PT today.          Problem: Physical Therapy    Goal Priority Disciplines Outcome Interventions   Physical Therapy Goal     PT, PT/OT Progressing    Description: Goals to be met by: 5/6/2025       Patient will increase functional independence with  mobility by performin. Sit to stand transfer with Stand-by Assistance- Met  2. Gait  x 50 feet with Contact Guard Assistance using Rolling Walker. - Met  3. Ascend/descend 4 stair with right Handrails Contact Guard Assistance using No Assistive Device.- Met                          DME Justifications:   Erin's mobility limitation cannot be sufficiently resolved by the use of a cane. Her functional mobility deficit can be sufficiently resolved with the use of a Rolling Walker. Patient's mobility limitation significantly impairs their ability to participate in one of more activities of daily living.  The use of a RW will significantly improve the patient's ability to participate in MRADLS and the patient will use it on regular basis in the home.    History:     Past Medical History:   Diagnosis Date    GERD (gastroesophageal reflux disease)     Human immunodeficiency virus (HIV) disease        Past Surgical History:   Procedure Laterality Date    COLONOSCOPY, SCREENING, LOW RISK PATIENT N/A 2025    Procedure: COLONOSCOPY, SCREENING, LOW RISK PATIENT;  Surgeon: Rafael Rai MD;  Location: [address]);  Service: Endoscopy;  Laterality: N/A;   ref by Dr. Yefri Avilez, Peg, portal. kristina  -precall complete-ES    REPAIR OF MENISCUS OF KNEE Right        Time Tracking:     PT Received On: 25  PT Start Time: 1246  PT 2nd session start time:  15:14 pm  PT Stop Time: 1315  PT 2nd session stop time:  15:42 pm  PT Total Time (min): 29 min 1st Session   PT Total time:  28 min 2nd Session    Billable Minutes: Evaluation 10, Gait Training 18, and Therapeutic Activity 29    2025

## 2025-05-06 NOTE — INTERVAL H&P NOTE
Erin Johnson was interviewed, examined and the H and P reviewed.  There has been no interval change in her History and Physical.

## 2025-05-06 NOTE — ANESTHESIA PROCEDURE NOTES
Spinal    Diagnosis: osteoarthritis  Patient location during procedure: OR  Start time: 5/6/2025 8:20 AM  Timeout: 5/6/2025 8:19 AM  End time: 5/6/2025 8:21 AM    Staffing  Authorizing Provider: Indra Renteria MD  Performing Provider: Indra Renteria MD    Staffing  Performed by: Indra Renteria MD  Authorized by: Indra Renteria MD    Preanesthetic Checklist  Completed: patient identified, IV checked, site marked, risks and benefits discussed, surgical consent, monitors and equipment checked, pre-op evaluation and timeout performed  Spinal Block  Patient position: sitting  Prep: ChloraPrep  Patient monitoring: heart rate, continuous pulse ox and frequent blood pressure checks  Approach: midline  Location: L4-5  Injection technique: single shot  CSF Fluid: clear free-flowing CSF  Needle  Needle type: pencil-tip   Needle gauge: 25 G  Needle length: 3.5 in  Additional Documentation: incremental injection, negative aspiration for heme and no paresthesia on injection  Needle localization: anatomical landmarks  Assessment  Ease of block: easy  Patient's tolerance of the procedure: comfortable throughout block  Medications:    Medications: mepivacaine (CARBOCAINE) injection 15 mg/mL (1.5%) - Intrathecal   3.2 mL - 5/6/2025 8:21:00 AM

## 2025-05-06 NOTE — PLAN OF CARE
Discharge instructions reviewed and pt verbalizes understanding. Pain control appropriate. CADD pump in place. Blue armband on. Pt voided. X-ray completed. Dr. Cotto at bedside. Pt able to tolerate PO liquids. Dressing is clean, dry, and intact. VSS and afebrile. Walker at bedside. Meds delivered to bedside. Pt ambulatory. AVS complete.

## 2025-05-06 NOTE — PLAN OF CARE
Preop complete. Pt resting comfortably. States needs a walker for discharge. Pt without complaints. Call light in reach,side rails up, bed in lowest position

## 2025-05-06 NOTE — PLAN OF CARE
Problem: Occupational Therapy  Goal: Occupational Therapy Goal  Description: Goals to be met by: 5/6/25     Patient will increase functional independence with ADLs by performing:    UE Dressing with Modified Clarissa.  LE Dressing with Modified Clarissa and Assistive Devices as needed.  Grooming while standing at sink with Modified Clarissa.  Toileting from bedside commode with Modified Clarissa for hygiene and clothing management.   Bathing from  shower chair/bench with Modified Clarissa.  Toilet transfer to bedside commode with Modified Clarissa.    Outcome: Met

## 2025-05-06 NOTE — PT/OT/SLP EVAL
"Occupational Therapy   Evaluation and Discharge Note    Name: Erin Johnson  MRN: [id number]  Admitting Diagnosis: <principal problem not specified>  Recent Surgery: Procedure(s) (LRB):  ARTHROPLASTY, KNEE, TOTAL, USING COMPUTER-ASSISTED NAVIGATION: RIGHT: SAME DAY (Right) * Day of Surgery *    Recommendations:     Discharge Recommendations: Low Intensity Therapy  Discharge Equipment Recommendations:  bedside commode, bath bench  Barriers to discharge:  Inaccessible home environment    Assessment:     Erin Johnson is a 60 y.o. female with a medical diagnosis of <principal problem not specified>.  She presents with R TKA. Performance deficits affecting function: impaired self care skills, impaired functional mobility, orthopedic precautions.  Pt was able to perform supine/sit T/F c CGA and Sit <> Stand Transfer with contact guard assistance with rolling walker Bed <> Chair Transfer using Stand Pivot technique with contact guard assistance with rolling walker Toilet Transfer Stand Pivot technique with contact guard assistance with rolling walker and bedside commode.  Able to perform UB/LB dressing c modified independence  Educated pt on bathing, car transfers, and cryotherapy.       Rehab Prognosis: Good; patient would benefit from acute skilled OT services to address these deficits and reach maximum level of function.       Plan:     Patient to be seen daily to address the above listed problems via self-care/home management, therapeutic activities, therapeutic exercises  Plan of Care Expires: 05/07/25  Plan of Care Reviewed with: patient, sibling    Subjective     Chief Complaint: R TKA  Patient/Family Comments/goals: "I can't get my shoe on."    Occupational Profile:  Living Environment: Pt lives in a 1 story house c 6 LORAINE and B rails which are wide.  Pt states that her stairs are not in good shape.  Has a tub/shower combo.  Previous level of function: I PTA  Roles and Routines:   Equipment Used at " Home: walker, rolling  Assistance upon Discharge: Pt lives alone and her sister will be c her initially.    Pain/Comfort:  Pain Rating 1: 5/10    Patients cultural, spiritual, Yarsanism conflicts given the current situation: no    Objective:     Communicated with: RN prior to session.  Patient found supine with blood pressure cuff, cryotherapy, FCD, perineural catheter, peripheral IV, pulse ox (continuous), telemetry upon OT entry to room.    General Precautions: Standard, fall  Orthopedic Precautions: RLE weight bearing as tolerated  Braces: N/A  Respiratory Status: Room air    Occupational Performance:    Bed Mobility:    Patient completed Supine to Sit with minimum assistance    Functional Mobility/Transfers:  Patient completed Sit <> Stand Transfer with contact guard assistance  with  rolling walker   Patient completed Bed <> Chair Transfer using Stand Pivot technique with contact guard assistance with rolling walker  Patient completed Toilet Transfer Stand Pivot technique with contact guard assistance with  rolling walker and bedside commode  Functional Mobility: Pt was able to walk from bed to bathroom and then to chair c CGA and RW.    Activities of Daily Living:  Upper Body Dressing: modified independence to don shirt.  Lower Body Dressing: modified independence to don underwear, skirt and min A for socks and shoes.    Physical Exam:  Upper Extremity Range of Motion:     -       Right Upper Extremity: WFL  -       Left Upper Extremity: WFL  Upper Extremity Strength:    -       Right Upper Extremity: WFL  -       Left Upper Extremity: WFL    AMPAC 6 Click ADL:  AMPAC Total Score: 22    Patient left up in chair with all lines intact, call button in reach, RN notified, and sister present    GOALS:   Multidisciplinary Problems       Occupational Therapy Goals       Not on file              Multidisciplinary Problems (Resolved)          Problem: Occupational Therapy    Goal Priority Disciplines Outcome  Interventions   Occupational Therapy Goal   (Resolved)     OT, PT/OT Met    Description: Goals to be met by: 5/6/25     Patient will increase functional independence with ADLs by performing:    UE Dressing with Modified Oliver.  LE Dressing with Modified Oliver and Assistive Devices as needed.  Grooming while standing at sink with Modified Oliver.  Toileting from bedside commode with Modified Oliver for hygiene and clothing management.   Bathing from  shower chair/bench with Modified Oliver.  Toilet transfer to bedside commode with Modified Oliver.                         DME Justifications:   Erin requires a commode for home use because she is confined to one level of the home environment and there is no toilet on that level.    History:     Past Medical History:   Diagnosis Date    GERD (gastroesophageal reflux disease)     Human immunodeficiency virus (HIV) disease          Past Surgical History:   Procedure Laterality Date    COLONOSCOPY, SCREENING, LOW RISK PATIENT N/A 03/07/2025    Procedure: COLONOSCOPY, SCREENING, LOW RISK PATIENT;  Surgeon: Rafael Rai MD;  Location: [address]);  Service: Endoscopy;  Laterality: N/A;  1/24 ref by Dr. Yefri Avilez, Peg, portal. kristina  1/27-precall complete-ES    REPAIR OF MENISCUS OF KNEE Right 2006       Time Tracking:     OT Date of Treatment: 05/06/25  OT Start Time: 1402  OT Stop Time: 1446  OT Total Time (min): 44 min    Billable Minutes:Evaluation 14  Self Care/Home Management 15  Therapeutic Activity 15    5/6/2025

## 2025-05-06 NOTE — PROGRESS NOTES
Pt felt dizzy and lightheaded working with PT. Pt brought back to room BP 90s. IV fluid bolus started.

## 2025-05-06 NOTE — PLAN OF CARE
Problem: Physical Therapy  Goal: Physical Therapy Goal  Description: Goals to be met by: 2025       Patient will increase functional independence with mobility by performin. Sit to stand transfer with Stand-by Assistance  2. Gait  x 50 feet with Contact Guard Assistance using Rolling Walker.   3. Ascend/descend 4 stair with right Handrails Contact Guard Assistance using No Assistive Device.     Outcome: Progressing

## 2025-05-06 NOTE — PROGRESS NOTES
Patient tolerating a sandwich. Patient has strong plantar flexion of both feet but weak dorsi flexion at this time.

## 2025-05-06 NOTE — TRANSFER OF CARE
Anesthesia Transfer of Care Note    Patient: Erin Johnson    Procedure(s) Performed: Procedure(s) (LRB):  ARTHROPLASTY, KNEE, TOTAL, USING COMPUTER-ASSISTED NAVIGATION: RIGHT: SAME DAY (Right)    Patient location: PACU    Anesthesia Type: general and spinal    Transport from OR: Transported from OR on 100% O2 by closed face mask with adequate spontaneous ventilation    Post pain: adequate analgesia    Post assessment: no apparent anesthetic complications and tolerated procedure well    Post vital signs: stable    Level of consciousness: sedated and responds to stimulation    Nausea/Vomiting: no nausea/vomiting    Complications: none    Transfer of care protocol was followed    Last vitals: Visit Vitals  BP (!) 112/57 (BP Location: Left arm, Patient Position: Lying)   Pulse 72   Resp (!) 26   LMP  (LMP Unknown)   SpO2 (!) 94%   Breastfeeding No

## 2025-05-07 ENCOUNTER — PATIENT MESSAGE (OUTPATIENT)
Dept: ORTHOPEDICS | Facility: CLINIC | Age: 60
End: 2025-05-07
Payer: COMMERCIAL

## 2025-05-07 NOTE — ANESTHESIA POSTPROCEDURE EVALUATION
Anesthesia Post Evaluation    Patient: Erin Johnson    Procedure(s) Performed: Procedure(s) (LRB):  ARTHROPLASTY, KNEE, TOTAL, USING COMPUTER-ASSISTED NAVIGATION: RIGHT: SAME DAY (Right)    Final Anesthesia Type: general      Patient location during evaluation: PACU  Patient participation: Yes- Able to Participate  Level of consciousness: awake and alert and oriented  Post-procedure vital signs: reviewed and stable  Pain management: adequate  Airway patency: patent    PONV status at discharge: No PONV  Anesthetic complications: no      Cardiovascular status: blood pressure returned to baseline and hemodynamically stable  Respiratory status: unassisted, room air and spontaneous ventilation  Hydration status: euvolemic  Follow-up not needed.              Vitals Value Taken Time   /58 05/06/25 14:15   Temp 36.6 °C (97.9 °F) 05/06/25 14:15   Pulse 69 05/06/25 13:31   Resp 18 05/06/25 13:05   SpO2 96 % 05/06/25 13:31   Vitals shown include unfiled device data.      Event Time   Out of Recovery 12:49:00         Pain/Giuliano Score: Pain Rating Prior to Med Admin: 7 (5/6/2025 12:46 PM)  Giuliano Score: 10 (5/6/2025  3:24 PM)

## 2025-05-08 ENCOUNTER — CLINICAL SUPPORT (OUTPATIENT)
Dept: REHABILITATION | Facility: HOSPITAL | Age: 60
End: 2025-05-08
Attending: ORTHOPAEDIC SURGERY
Payer: COMMERCIAL

## 2025-05-08 ENCOUNTER — CLINICAL SUPPORT (OUTPATIENT)
Dept: ORTHOPEDICS | Facility: CLINIC | Age: 60
End: 2025-05-08
Payer: COMMERCIAL

## 2025-05-08 DIAGNOSIS — G89.29 CHRONIC PAIN OF RIGHT KNEE: Primary | ICD-10-CM

## 2025-05-08 DIAGNOSIS — Z96.651 S/P TOTAL KNEE REPLACEMENT, RIGHT: Primary | ICD-10-CM

## 2025-05-08 DIAGNOSIS — M17.11 PRIMARY OSTEOARTHRITIS OF RIGHT KNEE: ICD-10-CM

## 2025-05-08 DIAGNOSIS — M25.561 CHRONIC PAIN OF RIGHT KNEE: Primary | ICD-10-CM

## 2025-05-08 DIAGNOSIS — R26.2 DIFFICULTY WALKING: ICD-10-CM

## 2025-05-08 PROCEDURE — 97112 NEUROMUSCULAR REEDUCATION: CPT

## 2025-05-08 PROCEDURE — 97530 THERAPEUTIC ACTIVITIES: CPT

## 2025-05-08 PROCEDURE — 97162 PT EVAL MOD COMPLEX 30 MIN: CPT

## 2025-05-08 NOTE — PROGRESS NOTES
Outpatient Rehab    Physical Therapy Evaluation    Patient Name: Erin Johnson  MRN: [id number]  YOB: 1965  Encounter Date: 5/8/2025    Therapy Diagnosis:   Encounter Diagnoses   Name Primary?    Primary osteoarthritis of right knee     Chronic pain of right knee Yes    Difficulty walking      Physician: Babatunde Cotto MD    Physician Orders: Eval and Treat  Medical Diagnosis: Primary osteoarthritis of right knee    Visit # / Visits Authorized:  1 / 1  Insurance Authorization Period: 3/12/2025 to 12/31/2025  Date of Evaluation: 5/8/2025  Plan of Care Certification: 5/8/2025 to 6/20/2025     Time In: 1400   Time Out: 1500  Total Time (in minutes): 60   Total Billable Time (in minutes): 60    Intake Outcome Measure for FOTO Survey    Therapist reviewed FOTO scores for Erin Johnson on 5/8/2025.   FOTO report - see Media section or FOTO account episode details.     Intake Score:  %         Subjective   History of Present Illness  Erin is a 60 y.o. female                  History of Present Condition/Illness: Pt presents s/p R TKA on 5/6/2025. She presents with RW and pain pump in place.     Activities of Daily Living              Difficulty with prolonged walking and weightbearing activities. Denies knee buckling currently.         Pain     Patient reports a current pain level of 0/10. Pain at best is reported as 0/10. Pain at worst is reported as 10/10.   Location: R knee  Pain-Relieving Factors: Ice, Medications - prescription  Tingling along medial knee and calf.       Living Arrangements  Single story home. 5 steps to enter with bilateral railing. Lives alone but has family in town for the first month following surgery.       Employment  Employment Status: Not employed          Past Medical History/Physical Systems Review:   Erin Johnson  has a past medical history of GERD (gastroesophageal reflux disease) and Human immunodeficiency virus (HIV) disease.    Erin Johnson  has a past surgical  history that includes colonoscopy, screening, low risk patient (N/A, 03/07/2025); Repair of meniscus of knee (Right, 2006); and arthroplasty, knee, total, using computer-assisted navigation (Right, 5/6/2025).    Erin has a current medication list which includes the following prescription(s): acetaminophen, aspirin, celecoxib, cetirizine, dovato, ibuprofen, methocarbamol, oxycodone, pantoprazole, polyethylene glycol, pravastatin, and triazolam.    Review of patient's allergies indicates:  No Known Allergies     Objective       Knee Range of Motion   Right Knee   Active (deg) Passive (deg) Pain   Flexion 50       Extension -2                          Knee Strength   Right Strength Right Pain Left Strength Left  Pain   Flexion (S2) 3-         Prone Flexion           Extension (L3) 3-                       Timed Up & Go (TUG)  Time: 58 seconds     An older adult who takes >=12 seconds to complete the TUG is at risk for falling.               Treatment:  Balance/Neuromuscular Re-Education  NMR 2: SAQs, 1 x 10 with strap, hold 5 seconds, Quad sets 1 x 10, hold 10 seconds  NMR 3: Heel prop to improve tissue extensibility and fascilitate quad activation x 30 seconds  NMR 4: Heel slides to improve tissue extensibility and ROM: 10 second hold, 10 reps  NMR 6: Ambulation with RW in clinic with emphasis on proper gait cycle.  Therapeutic Activity  TA 1: Education on TKA rehab, signs/sx of infection/DVT, edema management, quad function, NMES    Time Entry(in minutes):  PT Evaluation (Moderate) Time Entry: 30  Neuromuscular Re-Education Time Entry: 15  Therapeutic Activity Time Entry: 15    Assessment & Plan   Assessment  Erin presents with a condition of Moderate complexity.   Presentation of Symptoms: Evolving            Patient Goal for Therapy (PT): Return to ADLs  Prognosis: Good  Assessment Details: Pt is 2 days s/p R TKA. Patient demonstrates decreased lower extremity strength, impaired right quadriceps motor control and  strength, difficulty ambulating long distances, and reduced functional mobility. Patient would benefit from skilled outpatient physical therapy to address motor control and strength deficits so that she may return to full independence with all household and personal ADL's, and improve overall functional mobility, and meet all social and recreational needs.    Plan  From a physical therapy perspective, the patient would benefit from: Skilled Rehab Services    Planned therapy interventions include: Therapeutic exercise, Therapeutic activities, Neuromuscular re-education, Manual therapy, and Gait training.    Planned modalities to include: Cryotherapy (cold pack), Electrical stimulation - passive/unattended, and Thermotherapy (hot pack).        Visit Frequency: 3 times Per Week for 6 Weeks.       This plan was discussed with Patient.   Discussion participants: Agreed Upon Plan of Care  Plan details: 3x's/week for 4 weeks then 2x's week for 2 weeks          Patient's spiritual, cultural, and educational needs considered and patient agreeable to plan of care and goals.           Goals:   Active       Ambulation/movement       Patient will walk with community distances with normal gait pattern in order to demonstrate improved functional ability.        Start:  05/09/25    Expected End:  06/20/25            Patient will navigate 1 flight of stairs without difficulty in order to demonstrate improved functional ability.        Start:  05/09/25    Expected End:  06/20/25               Changing body position       Patient will demonstrate moving sit to stand 12 reps in 30 seconds in order to demonstrate improved transfer ability.        Start:  05/09/25    Expected End:  06/20/25               Functional outcome       Patient will demonstrate independence in home program for support of progression       Start:  05/09/25    Expected End:  06/06/25               Home activities       Patient will perform household indoor  maintenance without difficulty in order to demonstrate improved functional ability.        Start:  05/09/25    Expected End:  06/20/25               Range of Motion       Patient will achieve right knee ROM of  0-120 degrees in order to improve gait and transfer ability.        Start:  05/09/25    Expected End:  06/20/25               Strength       Patient will achieve right knee flexion strength of >/= 4+/5 in order to demonstrate improved knee stability.        Start:  05/09/25    Expected End:  06/20/25            Patient will achieve right knee extension strength of >/= 4+/5 in order to demonstrate improved knee stability.        Start:  05/09/25    Expected End:  06/20/25                Jones Ritchie, PT, DPT

## 2025-05-08 NOTE — PROGRESS NOTES
I called the patient today regarding her  surgery with Dr. Cotto. The patient had a right TKA on 5/6/25.     Pain Scale: 7 / 10    Any issues with Fever: No.    Any issues with medications (specifically DVT prophylaxis): No.    Pain medication: no;  Celebrex : no  Protonix : no  Resume home meds : Yes    Any issues with:   Bowel movements: Yes: , no bm since surgery, pt reminded of need to take stool softener  Passing ragini: No.  Urination: No.    Completing at home exercises: Yes    Any concerns regarding their dressing/bandage:  No.    Patient confirmed first OP-PT appointment:  yes on  5/7/25     Any other concerns: No.    Blue Bracelet : yes    The patient was informed that if they have any urgent issues with their bandage, medications or any other health concerns regarding their surgery to call the 24/7 Orthopedic Post-op Hot Line at [phone]. The patient was reminded that if they have any chest pain or shortness of breath to call 911 or go to the ER.    The patient verbalized understanding and does not have any other questions

## 2025-05-09 ENCOUNTER — CLINICAL SUPPORT (OUTPATIENT)
Dept: REHABILITATION | Facility: HOSPITAL | Age: 60
End: 2025-05-09
Payer: COMMERCIAL

## 2025-05-09 DIAGNOSIS — R26.2 DIFFICULTY WALKING: ICD-10-CM

## 2025-05-09 DIAGNOSIS — M25.561 CHRONIC PAIN OF RIGHT KNEE: ICD-10-CM

## 2025-05-09 DIAGNOSIS — G89.29 CHRONIC PAIN OF RIGHT KNEE: ICD-10-CM

## 2025-05-09 DIAGNOSIS — M17.11 PRIMARY OSTEOARTHRITIS OF RIGHT KNEE: Primary | ICD-10-CM

## 2025-05-09 PROCEDURE — 97112 NEUROMUSCULAR REEDUCATION: CPT

## 2025-05-09 PROCEDURE — 97530 THERAPEUTIC ACTIVITIES: CPT

## 2025-05-09 NOTE — PROGRESS NOTES
"  Outpatient Rehab    Physical Therapy Visit    Patient Name: Erni Johnson  MRN: [id number]  YOB: 1965  Encounter Date: 5/9/2025    Therapy Diagnosis:   Encounter Diagnoses   Name Primary?    Primary osteoarthritis of right knee Yes    Difficulty walking     Chronic pain of right knee      Physician: Babatunde Cotto MD    Physician Orders: Eval and Treat  Medical Diagnosis: Unilateral primary osteoarthritis, right knee    Visit # / Visits Authorized:  1 / 20  Insurance Authorization Period: 3/26/2025 to 12/31/2025  Date of Evaluation: 5/8/2025  Plan of Care Certification: 5/8/2025 to 6/20/2025      PT/PTA:     Number of PTA visits since last PT visit:   Time In: 1200   Time Out: 1300  Total Time (in minutes): 60   Total Billable Time (in minutes): 50    FOTO:  Intake Score:  %  Survey Score 2:  %  Survey Score 3:  %         Subjective   Pt reports she slept better last night. She says that her knee felt fine following yesterday's session..  Pain reported as 6/10.      Objective            Treatment:  Therapeutic Exercise  TE 1: Calf stretch with green strap, 3 x 30"  TE 2: Seated hamstring stretch, 4 x 30" hold  Balance/Neuromuscular Re-Education  NMR 2: SAQs, 5 minutes of reps with strap, hold 5 seconds  NMR 3: Heel prop to improve tissue extensibility and fascilitate quad activation x 3 minutes  NMR 4: Heel slides to improve tissue extensibility and ROM: 10 second hold, 5 minutes of reps  NMR 5: Quad sets, 5 minutes of reps, 10 second holds  NMR 7: Seated LAQs, 10 reps, hold 10 seconds  Therapeutic Activity  TA 1: Education on TKA rehab, signs/sx of infection/DVT, edema management, quad function, NMES  Modalities  Cryotherapy (Minutes\Location): 10 minutes to R knee while RLE elevated    Time Entry(in minutes):  Hot/Cold Pack Time Entry: 10  Neuromuscular Re-Education Time Entry: 40  Therapeutic Activity Time Entry: 10    Assessment & Plan   Assessment: Pt tolerated treatment well. She had " appropriate muscle response to all interventions this date. She had some bleeding escaping confines of tegaderm that surrounds pain pump needle. Extra tegaderm was put in place to prevent excess bleeding down her leg. Knee ROM measured -2 to 64 degrees this visit.       Patient will continue to benefit from skilled outpatient physical therapy to address the deficits listed in the problem list box on initial evaluation, provide pt/family education and to maximize pt's level of independence in the home and community environment.     Patient's spiritual, cultural, and educational needs considered and patient agreeable to plan of care and goals.           Plan: Continue with established POC.    Goals:   Active       Ambulation/movement       Patient will walk with community distances with normal gait pattern in order to demonstrate improved functional ability.  (Progressing)       Start:  05/09/25    Expected End:  06/20/25            Patient will navigate 1 flight of stairs without difficulty in order to demonstrate improved functional ability.  (Progressing)       Start:  05/09/25    Expected End:  06/20/25               Changing body position       Patient will demonstrate moving sit to stand 12 reps in 30 seconds in order to demonstrate improved transfer ability.  (Progressing)       Start:  05/09/25    Expected End:  06/20/25               Functional outcome       Patient will demonstrate independence in home program for support of progression (Progressing)       Start:  05/09/25    Expected End:  06/06/25               Home activities       Patient will perform household indoor maintenance without difficulty in order to demonstrate improved functional ability.  (Progressing)       Start:  05/09/25    Expected End:  06/20/25               Range of Motion       Patient will achieve right knee ROM of  0-120 degrees in order to improve gait and transfer ability.  (Progressing)       Start:  05/09/25    Expected End:   06/20/25               Strength       Patient will achieve right knee flexion strength of >/= 4+/5 in order to demonstrate improved knee stability.  (Progressing)       Start:  05/09/25    Expected End:  06/20/25            Patient will achieve right knee extension strength of >/= 4+/5 in order to demonstrate improved knee stability.  (Progressing)       Start:  05/09/25    Expected End:  06/20/25                Jones Ritchie PT

## 2025-05-12 ENCOUNTER — CLINICAL SUPPORT (OUTPATIENT)
Dept: REHABILITATION | Facility: HOSPITAL | Age: 60
End: 2025-05-12
Payer: COMMERCIAL

## 2025-05-12 DIAGNOSIS — M25.561 CHRONIC PAIN OF RIGHT KNEE: ICD-10-CM

## 2025-05-12 DIAGNOSIS — R26.2 DIFFICULTY WALKING: ICD-10-CM

## 2025-05-12 DIAGNOSIS — M17.11 PRIMARY OSTEOARTHRITIS OF RIGHT KNEE: Primary | ICD-10-CM

## 2025-05-12 DIAGNOSIS — G89.29 CHRONIC PAIN OF RIGHT KNEE: ICD-10-CM

## 2025-05-12 PROCEDURE — 97112 NEUROMUSCULAR REEDUCATION: CPT | Mod: CQ

## 2025-05-12 PROCEDURE — 97530 THERAPEUTIC ACTIVITIES: CPT | Mod: CQ

## 2025-05-12 PROCEDURE — 97014 ELECTRIC STIMULATION THERAPY: CPT | Mod: CQ

## 2025-05-12 NOTE — PROGRESS NOTES
Outpatient Rehab    Physical Therapy Visit    Patient Name: Erin Johnson  MRN: [id number]  YOB: 1965  Encounter Date: 5/12/2025    Therapy Diagnosis:   Encounter Diagnoses   Name Primary?    Primary osteoarthritis of right knee Yes    Difficulty walking     Chronic pain of right knee      Physician: Babatunde Cotto MD    Physician Orders: Eval and Treat  Medical Diagnosis: Unilateral primary osteoarthritis, right knee    Visit # / Visits Authorized:  2 / 20  Insurance Authorization Period: 3/26/2025 to 12/31/2025  Date of Evaluation: 5/8/2025  Plan of Care Certification: 5/8/2025 to 6/20/2025      PT/PTA: PTA   Number of PTA visits since last PT visit:1  Time In: 1230   Time Out: 1330  Total Time (in minutes): 60   Total Billable Time (in minutes): 60    FOTO:  Intake Score: 31%  Survey Score 2:  %  Survey Score 3:  %    Precautions: None    Subjective   Patient reports that she took the pump out and has noticed an increase in pain since. She was able to get her sock on and get into the car without asssitance.  Pain reported as 5/10. Right knee    Objective   Range of Motion:   Knee Right Active   Flexion Pre: 73 degrees AAROM  Post: 79 degrees AAROM   Extension Post: +1 degree hyperextension     Treatment:  Balance/Neuromuscular Re-Education  NMR 1: Patient education: HEP compliance, swelling management, ROM expectations post TKA, importance of extension ROM for proper gait cycle  NMR 2: Russian NMES 10 sec on/20 sec off including: quad sets with towel roll x 15 minutes, SAQs with medium bolster x 5 minutes  NMR 3: Heel prop to improve tissue extensibility and fascilitate quad activation x 3 minutes with HS stretch  NMR 4: Heel slides to improve tissue extensibility and ROM: 10 second hold, 5 minutes -- seated heel slides at EOM: 10 second hold, 5 minutes  NMR 6: Ambulation with RW in clinic with emphasis on proper gait cycle  Therapeutic Activity  TA 1: Education on TKA rehab, signs/sx of  infection/DVT, edema management, quad function, NMES    Time Entry(in minutes):  E-Stim (Unattended) Time Entry: 20  Hot/Cold Pack Time Entry: 5  Neuromuscular Re-Education Time Entry: 47  Therapeutic Activity Time Entry: 8    Assessment & Plan   Assessment: Initiated use of NMES for quadriceps strengthening and motor control with good tolerance noting adequate muscle response throughout. There was noted pain with short arc quads despite use of strap; only tolerated 5 minutes before ending exercise. Measured 1-0-79 degrees AAROM Flexion post session. Able to sit 90/90 at edge of table with seated heel slides. Overall improvement in ROM compared to previous session.  Evaluation/Treatment Tolerance: Patient tolerated treatment well    Patient will continue to benefit from skilled outpatient physical therapy to address the deficits listed in the problem list box on initial evaluation, provide pt/family education and to maximize pt's level of independence in the home and community environment.     Patient's spiritual, cultural, and educational needs considered and patient agreeable to plan of care and goals.           Plan: Will continue per POC towards treatment goals. PT/PTA met face to face to discuss patient's treatment plan and progress towards established goals. Patient will be seen by physical therapist every sixth visit and minimally once per month.    Goals:   Active       Ambulation/movement       Patient will walk with community distances with normal gait pattern in order to demonstrate improved functional ability.  (Progressing)       Start:  05/09/25    Expected End:  06/20/25            Patient will navigate 1 flight of stairs without difficulty in order to demonstrate improved functional ability.  (Progressing)       Start:  05/09/25    Expected End:  06/20/25               Changing body position       Patient will demonstrate moving sit to stand 12 reps in 30 seconds in order to demonstrate improved  transfer ability.  (Progressing)       Start:  05/09/25    Expected End:  06/20/25               Functional outcome       Patient will demonstrate independence in home program for support of progression (Progressing)       Start:  05/09/25    Expected End:  06/06/25               Home activities       Patient will perform household indoor maintenance without difficulty in order to demonstrate improved functional ability.  (Progressing)       Start:  05/09/25    Expected End:  06/20/25               Range of Motion       Patient will achieve right knee ROM of  0-120 degrees in order to improve gait and transfer ability.  (Progressing)       Start:  05/09/25    Expected End:  06/20/25               Strength       Patient will achieve right knee flexion strength of >/= 4+/5 in order to demonstrate improved knee stability.  (Progressing)       Start:  05/09/25    Expected End:  06/20/25            Patient will achieve right knee extension strength of >/= 4+/5 in order to demonstrate improved knee stability.  (Progressing)       Start:  05/09/25    Expected End:  06/20/25                Velvet Puente, PTA

## 2025-05-14 ENCOUNTER — CLINICAL SUPPORT (OUTPATIENT)
Dept: REHABILITATION | Facility: HOSPITAL | Age: 60
End: 2025-05-14
Payer: COMMERCIAL

## 2025-05-14 DIAGNOSIS — M25.561 CHRONIC PAIN OF RIGHT KNEE: ICD-10-CM

## 2025-05-14 DIAGNOSIS — R26.2 DIFFICULTY WALKING: ICD-10-CM

## 2025-05-14 DIAGNOSIS — M17.11 PRIMARY OSTEOARTHRITIS OF RIGHT KNEE: Primary | ICD-10-CM

## 2025-05-14 DIAGNOSIS — G89.29 CHRONIC PAIN OF RIGHT KNEE: ICD-10-CM

## 2025-05-14 PROCEDURE — 97112 NEUROMUSCULAR REEDUCATION: CPT

## 2025-05-14 PROCEDURE — 97530 THERAPEUTIC ACTIVITIES: CPT

## 2025-05-14 PROCEDURE — 97014 ELECTRIC STIMULATION THERAPY: CPT

## 2025-05-14 NOTE — PROGRESS NOTES
"  Outpatient Rehab    Physical Therapy Visit    Patient Name: Erin Johnson  MRN: [id number]  YOB: 1965  Encounter Date: 5/14/2025    Therapy Diagnosis:   Encounter Diagnoses   Name Primary?    Primary osteoarthritis of right knee Yes    Difficulty walking     Chronic pain of right knee      Physician: Babatunde Cotto MD    Physician Orders: Eval and Treat  Medical Diagnosis: Unilateral primary osteoarthritis, right knee    Visit # / Visits Authorized:  3 / 20  Insurance Authorization Period: 3/26/2025 to 12/31/2025  Date of Evaluation: 5/8/2025  Plan of Care Certification: 5/8/2025 to 6/20/2025      PT/PTA:     Number of PTA visits since last PT visit:   Time In: 1300   Time Out: 1400  Total Time (in minutes): 60   Total Billable Time (in minutes): 60    FOTO:  Intake Score:  %  Survey Score 2:  %  Survey Score 3:  %    Precautions:       Subjective   Pt reports ongoing knee pain. She says she took pain medication and muscle relaxer before today's visit. She reports compliance with performing heel slides at home but says she has not been performing heel propping due to pain..  Pain reported as 5/10.      Objective            Treatment:  Therapeutic Exercise  TE 1: Calf stretch with green strap, 3 x 30"  TE 2: Seated hamstring stretch, 4 x 30" hold  Balance/Neuromuscular Re-Education  NMR 2: [place] NMES 10 sec on/30 sec off including: quad sets with towel roll x 10 minutes, SAQs with medium bolster x 10 minutes, SAQs with 1/2 foam x 10 minutes, LAQS with green strap x 10 minutes  NMR 3: Heel prop to improve tissue extensibility and fascilitate quad activation x 3 minutes  NMR 4: Heel slides to improve tissue extensibility and ROM: 10 second hold, 5 minutes -- seated heel slides at EOM: 10 second hold, 5 minutes  Therapeutic Activity  TA 1: Education on TKA rehab, signs/sx of infection/DVT, edema management, quad function, NMES  Modalities  Cryotherapy (Minutes\Location): 5 minutes to R knee with " leg elevated    Time Entry(in minutes):  E-Stim (Unattended) Time Entry: 40  Hot/Cold Pack Time Entry: 5  Neuromuscular Re-Education Time Entry: 47  Therapeutic Activity Time Entry: 8    Assessment & Plan   Assessment: Pt tolerated session well. She has ongoing knee pain, edema, and limtied ROM consistent with post op status. NMES used with good quadriceps activation in return. Quadriceps motor control is slowly improving but still lacks active terminal knee extension. Pt's knee ROM was measured at -2 to 80 degrees this visit.       Patient will continue to benefit from skilled outpatient physical therapy to address the deficits listed in the problem list box on initial evaluation, provide pt/family education and to maximize pt's level of independence in the home and community environment.     Patient's spiritual, cultural, and educational needs considered and patient agreeable to plan of care and goals.           Plan: Continue with established POC.    Goals:   Active       Ambulation/movement       Patient will walk with community distances with normal gait pattern in order to demonstrate improved functional ability.  (Progressing)       Start:  05/09/25    Expected End:  06/20/25            Patient will navigate 1 flight of stairs without difficulty in order to demonstrate improved functional ability.  (Progressing)       Start:  05/09/25    Expected End:  06/20/25               Changing body position       Patient will demonstrate moving sit to stand 12 reps in 30 seconds in order to demonstrate improved transfer ability.  (Progressing)       Start:  05/09/25    Expected End:  06/20/25               Functional outcome       Patient will demonstrate independence in home program for support of progression (Progressing)       Start:  05/09/25    Expected End:  06/06/25               Home activities       Patient will perform household indoor maintenance without difficulty in order to demonstrate improved functional  ability.  (Progressing)       Start:  05/09/25    Expected End:  06/20/25               Range of Motion       Patient will achieve right knee ROM of  0-120 degrees in order to improve gait and transfer ability.  (Progressing)       Start:  05/09/25    Expected End:  06/20/25               Strength       Patient will achieve right knee flexion strength of >/= 4+/5 in order to demonstrate improved knee stability.  (Progressing)       Start:  05/09/25    Expected End:  06/20/25            Patient will achieve right knee extension strength of >/= 4+/5 in order to demonstrate improved knee stability.  (Progressing)       Start:  05/09/25    Expected End:  06/20/25                Jones Ritchie PT

## 2025-05-16 ENCOUNTER — CLINICAL SUPPORT (OUTPATIENT)
Dept: REHABILITATION | Facility: HOSPITAL | Age: 60
End: 2025-05-16
Payer: COMMERCIAL

## 2025-05-16 DIAGNOSIS — G89.29 CHRONIC PAIN OF RIGHT KNEE: ICD-10-CM

## 2025-05-16 DIAGNOSIS — M17.11 PRIMARY OSTEOARTHRITIS OF RIGHT KNEE: Primary | ICD-10-CM

## 2025-05-16 DIAGNOSIS — R26.2 DIFFICULTY WALKING: ICD-10-CM

## 2025-05-16 DIAGNOSIS — M25.561 CHRONIC PAIN OF RIGHT KNEE: ICD-10-CM

## 2025-05-16 PROCEDURE — 97112 NEUROMUSCULAR REEDUCATION: CPT

## 2025-05-16 PROCEDURE — 97014 ELECTRIC STIMULATION THERAPY: CPT

## 2025-05-16 PROCEDURE — 97530 THERAPEUTIC ACTIVITIES: CPT

## 2025-05-16 NOTE — PROGRESS NOTES
Outpatient Rehab    Physical Therapy Visit    Patient Name: Erin Johnson  MRN: [id number]  YOB: 1965  Encounter Date: 5/16/2025    Therapy Diagnosis:   Encounter Diagnoses   Name Primary?    Primary osteoarthritis of right knee Yes    Difficulty walking     Chronic pain of right knee      Physician: Babatunde Cotto MD    Physician Orders: Eval and Treat  Medical Diagnosis: Unilateral primary osteoarthritis, right knee    Visit # / Visits Authorized:  4 / 20  Insurance Authorization Period: 3/26/2025 to 12/31/2025  Date of Evaluation: 5/8/2025  Plan of Care Certification: 5/8/2025 to 6/20/2025      PT/PTA:     Number of PTA visits since last PT visit:   Time In: 1207   Time Out: 1300  Total Time (in minutes): 53   Total Billable Time (in minutes): 53    FOTO:  Intake Score:  %  Survey Score 2:  %  Survey Score 3:  %    Precautions:       Subjective   Pt reports her knee pain has been improving. She says that she has been compliant with HEP..  Pain reported as 4/10.      Objective       Knee Range of Motion   Right Knee   Active (deg) Passive (deg) Pain   Flexion 85       Extension 0                       Treatment:  Balance/Neuromuscular Re-Education  NMR 2: Citizen of [place] NMES 10 sec on/30 sec off including: quad sets with towel roll x 10 minutes, SAQs with medium bolster x 10 minutes, SAQs with 1/2 foam x 10 minutes, LAQS with green strap x 10 minutes  NMR 3: Heel prop to improve tissue extensibility and fascilitate quad activation x 3 minutes  NMR 4: Heel slides to improve tissue extensibility and ROM: 10 second hold, 5 minutes -- seated heel slides at EOM: 10 second hold, 5 minutes  Therapeutic Activity  TA 1: Education on TKA rehab, signs/sx of infection/DVT, edema management, quad function, NMES    Time Entry(in minutes):  E-Stim (Unattended) Time Entry: 40  Neuromuscular Re-Education Time Entry: 43  Therapeutic Activity Time Entry: 10    Assessment & Plan   Assessment: Pt tolerated session well.  She has ongoing knee pain, edema, and limtied ROM consistent with post op status. NMES used with good quadriceps activation in return. Quadriceps motor control is improving well. Pt's knee ROM was measured at 0 to 85 degrees this visit.       Patient will continue to benefit from skilled outpatient physical therapy to address the deficits listed in the problem list box on initial evaluation, provide pt/family education and to maximize pt's level of independence in the home and community environment.     Patient's spiritual, cultural, and educational needs considered and patient agreeable to plan of care and goals.           Plan: Continue with established POC. SPC gait training will be initiated at next visit.    Goals:   Active       Ambulation/movement       Patient will walk with community distances with normal gait pattern in order to demonstrate improved functional ability.  (Progressing)       Start:  05/09/25    Expected End:  06/20/25            Patient will navigate 1 flight of stairs without difficulty in order to demonstrate improved functional ability.  (Progressing)       Start:  05/09/25    Expected End:  06/20/25               Changing body position       Patient will demonstrate moving sit to stand 12 reps in 30 seconds in order to demonstrate improved transfer ability.  (Progressing)       Start:  05/09/25    Expected End:  06/20/25               Functional outcome       Patient will demonstrate independence in home program for support of progression (Progressing)       Start:  05/09/25    Expected End:  06/06/25               Home activities       Patient will perform household indoor maintenance without difficulty in order to demonstrate improved functional ability.  (Progressing)       Start:  05/09/25    Expected End:  06/20/25               Range of Motion       Patient will achieve right knee ROM of  0-120 degrees in order to improve gait and transfer ability.  (Progressing)       Start:   05/09/25    Expected End:  06/20/25               Strength       Patient will achieve right knee flexion strength of >/= 4+/5 in order to demonstrate improved knee stability.  (Progressing)       Start:  05/09/25    Expected End:  06/20/25            Patient will achieve right knee extension strength of >/= 4+/5 in order to demonstrate improved knee stability.  (Progressing)       Start:  05/09/25    Expected End:  06/20/25                Jones Ritchie PT, DPT

## 2025-05-19 ENCOUNTER — CLINICAL SUPPORT (OUTPATIENT)
Dept: REHABILITATION | Facility: HOSPITAL | Age: 60
End: 2025-05-19
Payer: COMMERCIAL

## 2025-05-19 ENCOUNTER — OFFICE VISIT (OUTPATIENT)
Dept: ORTHOPEDICS | Facility: CLINIC | Age: 60
End: 2025-05-19
Payer: COMMERCIAL

## 2025-05-19 DIAGNOSIS — R26.2 DIFFICULTY WALKING: ICD-10-CM

## 2025-05-19 DIAGNOSIS — M17.11 PRIMARY OSTEOARTHRITIS OF RIGHT KNEE: Primary | ICD-10-CM

## 2025-05-19 DIAGNOSIS — Z96.651 S/P TOTAL KNEE REPLACEMENT, RIGHT: Primary | ICD-10-CM

## 2025-05-19 DIAGNOSIS — M25.561 CHRONIC PAIN OF RIGHT KNEE: ICD-10-CM

## 2025-05-19 DIAGNOSIS — G89.29 CHRONIC PAIN OF RIGHT KNEE: ICD-10-CM

## 2025-05-19 PROCEDURE — 97112 NEUROMUSCULAR REEDUCATION: CPT | Mod: CQ

## 2025-05-19 PROCEDURE — 97014 ELECTRIC STIMULATION THERAPY: CPT | Mod: CQ

## 2025-05-19 PROCEDURE — 99024 POSTOP FOLLOW-UP VISIT: CPT | Mod: S$GLB,,,

## 2025-05-19 PROCEDURE — 1159F MED LIST DOCD IN RCRD: CPT | Mod: CPTII,S$GLB,,

## 2025-05-19 PROCEDURE — 97530 THERAPEUTIC ACTIVITIES: CPT | Mod: CQ

## 2025-05-19 PROCEDURE — 1160F RVW MEDS BY RX/DR IN RCRD: CPT | Mod: CPTII,S$GLB,,

## 2025-05-19 PROCEDURE — 99999 PR PBB SHADOW E&M-EST. PATIENT-LVL III: CPT | Mod: PBBFAC,,,

## 2025-05-19 RX ORDER — METHOCARBAMOL 750 MG/1
750 TABLET, FILM COATED ORAL 4 TIMES DAILY
COMMUNITY

## 2025-05-19 NOTE — PROGRESS NOTES
"  Outpatient Rehab    Physical Therapy Visit    Patient Name: Erin Johnson  MRN: [id number]  YOB: 1965  Encounter Date: 5/19/2025    Therapy Diagnosis:   Encounter Diagnoses   Name Primary?    Primary osteoarthritis of right knee Yes    Difficulty walking     Chronic pain of right knee      Physician: Babatunde Cotto MD    Physician Orders: Eval and Treat  Medical Diagnosis: Unilateral primary osteoarthritis, right knee    Visit # / Visits Authorized:  5 / 20  Insurance Authorization Period: 3/26/2025 to 12/31/2025  Date of Evaluation: 5/8/2025  Plan of Care Certification: 5/8/2025 to 6/20/2025      PT/PTA: PTA   Number of PTA visits since last PT visit:1  Time In: 1230   Time Out: 1330  Total Time (in minutes): 60   Total Billable Time (in minutes): 60    FOTO:  Intake Score: 31%  Survey Score 2: 50%  Survey Score 3:  %    Precautions: none    Subjective   Patient reports that she saw the MD and had her cane fitted to her. Reports she has more "discomfort" than pain.  Pain reported as 0/10. Right knee    Objective   Range of Motion:   Knee Right Active   Flexion Post: 90 degrees AAROM     Treatment:  Balance/Neuromuscular Re-Education  NMR 1: Patient education: HEP compliance, swelling management, ROM expectations post TKA, importance of extension ROM for proper gait cycle  NMR 2: [place] NMES 10 sec on/20 sec off including: SAQs with medium bolster x 10 minutes, SAQs with 1/2 foam x 10 minutes, LAQs at EOM x 6 minutes  NMR 4: Heel slides to improve tissue extensibility and ROM: 10 second hold, 10-15 reps -- seated heel slides at EOM: 10 second hold, 5 minutes (NP)  NMR 6: Ambulation with SPC in clinic with emphasis on proper gait cycle. Educated on use of cane in Left hand, avoiding ER of Right foot and terminal knee extension.  Therapeutic Activity  TA 1: Education on TKA rehab, signs/sx of infection/DVT, edema management, quad function, NMES  TA 2: Nustep for CV and LE endurance: 5 minutes, " Level 1    Time Entry(in minutes):  E-Stim (Unattended) Time Entry: 26  Neuromuscular Re-Education Time Entry: 52  Therapeutic Activity Time Entry: 8    Assessment & Plan   Assessment: Notes discomfort with sitting at edge of mat during LAQs likely due to decreased tolerance to dependent sitting; encouraged practicing at home. Measured 90 degrees AAROM knee flexion with pain post session. Good tolerance overall to therapeutic interventions noting adequate muscle response throughout. Able to achieve terminal knee extension with SAQs and LAQs.  Evaluation/Treatment Tolerance: Patient tolerated treatment well    Patient will continue to benefit from skilled outpatient physical therapy to address the deficits listed in the problem list box on initial evaluation, provide pt/family education and to maximize pt's level of independence in the home and community environment.     Patient's spiritual, cultural, and educational needs considered and patient agreeable to plan of care and goals.           Plan: Will continue per POC towards treatment goals. PT/PTA met face to face to discuss patient's treatment plan and progress towards established goals. Patient will be seen by physical therapist every sixth visit and minimally once per month.    Goals:   Active       Ambulation/movement       Patient will walk with community distances with normal gait pattern in order to demonstrate improved functional ability.  (Progressing)       Start:  05/09/25    Expected End:  06/20/25            Patient will navigate 1 flight of stairs without difficulty in order to demonstrate improved functional ability.  (Progressing)       Start:  05/09/25    Expected End:  06/20/25               Changing body position       Patient will demonstrate moving sit to stand 12 reps in 30 seconds in order to demonstrate improved transfer ability.  (Progressing)       Start:  05/09/25    Expected End:  06/20/25               Functional outcome       Patient  will demonstrate independence in home program for support of progression (Progressing)       Start:  05/09/25    Expected End:  06/06/25               Home activities       Patient will perform household indoor maintenance without difficulty in order to demonstrate improved functional ability.  (Progressing)       Start:  05/09/25    Expected End:  06/20/25               Range of Motion       Patient will achieve right knee ROM of  0-120 degrees in order to improve gait and transfer ability.  (Progressing)       Start:  05/09/25    Expected End:  06/20/25               Strength       Patient will achieve right knee flexion strength of >/= 4+/5 in order to demonstrate improved knee stability.  (Progressing)       Start:  05/09/25    Expected End:  06/20/25            Patient will achieve right knee extension strength of >/= 4+/5 in order to demonstrate improved knee stability.  (Progressing)       Start:  05/09/25    Expected End:  06/20/25                Velvet Puente, PTA

## 2025-05-19 NOTE — PROGRESS NOTES
Erin Johnson presents for initial post-operative visit following a right total knee arthroplasty performed by Dr. Cotto on 5/6/2025.     Exam:   There were no vitals taken for this visit.   Ambulating well with assistive device.  Incision is clean and dry without drainage or erythema.   ROM:  0-85    Initial post-operative radiographs reviewed today revealing a well fixed and aligned prosthesis.    A/P:  2 weeks s/p right total knee arthroplasty    - The patient was advised to keep the incision clean and dry for the next 24 hours after which she may wash the area with antibacterial soap in the shower. Will not submerge until the incision is completely healed.   - Outpatient PT ongoing:  [facility]  - Patient provided single-point cane during visit.  - Continue aspirin for 1 month post op  - Pain medication:  No refill needed  - Reviewed antibiotic prophylaxis   - Follow up in 4 weeks with myself. Pt will call clinic with problems/concerns.

## 2025-05-21 ENCOUNTER — CLINICAL SUPPORT (OUTPATIENT)
Dept: REHABILITATION | Facility: HOSPITAL | Age: 60
End: 2025-05-21
Payer: COMMERCIAL

## 2025-05-21 DIAGNOSIS — G89.29 CHRONIC PAIN OF RIGHT KNEE: ICD-10-CM

## 2025-05-21 DIAGNOSIS — M25.561 CHRONIC PAIN OF RIGHT KNEE: ICD-10-CM

## 2025-05-21 DIAGNOSIS — M17.11 PRIMARY OSTEOARTHRITIS OF RIGHT KNEE: Primary | ICD-10-CM

## 2025-05-21 DIAGNOSIS — R26.2 DIFFICULTY WALKING: ICD-10-CM

## 2025-05-21 PROCEDURE — 97112 NEUROMUSCULAR REEDUCATION: CPT

## 2025-05-21 PROCEDURE — 97530 THERAPEUTIC ACTIVITIES: CPT

## 2025-05-23 ENCOUNTER — CLINICAL SUPPORT (OUTPATIENT)
Dept: REHABILITATION | Facility: HOSPITAL | Age: 60
End: 2025-05-23
Payer: COMMERCIAL

## 2025-05-23 DIAGNOSIS — G89.29 CHRONIC PAIN OF RIGHT KNEE: ICD-10-CM

## 2025-05-23 DIAGNOSIS — R26.2 DIFFICULTY WALKING: ICD-10-CM

## 2025-05-23 DIAGNOSIS — M17.11 PRIMARY OSTEOARTHRITIS OF RIGHT KNEE: Primary | ICD-10-CM

## 2025-05-23 DIAGNOSIS — M25.561 CHRONIC PAIN OF RIGHT KNEE: ICD-10-CM

## 2025-05-23 PROCEDURE — 97112 NEUROMUSCULAR REEDUCATION: CPT | Mod: CQ

## 2025-05-23 PROCEDURE — 97014 ELECTRIC STIMULATION THERAPY: CPT | Mod: CQ

## 2025-05-23 PROCEDURE — 97530 THERAPEUTIC ACTIVITIES: CPT | Mod: CQ

## 2025-05-23 NOTE — PROGRESS NOTES
Outpatient Rehab    Physical Therapy Visit    Patient Name: Erin Johnson  MRN: [id number]  YOB: 1965  Encounter Date: 5/23/2025    Therapy Diagnosis:   Encounter Diagnoses   Name Primary?    Primary osteoarthritis of right knee Yes    Difficulty walking     Chronic pain of right knee      Physician: Babatunde Cotto MD    Physician Orders: Eval and Treat  Medical Diagnosis: Unilateral primary osteoarthritis, right knee    Visit # / Visits Authorized:  7 / 20  Insurance Authorization Period: 3/26/2025 to 12/31/2025  Date of Evaluation: 5/8/2025  Plan of Care Certification: 5/8/2025 to 6/20/2025      PT/PTA: PTA   Number of PTA visits since last PT visit:2  Time In: 1100   Time Out: 1201  Total Time (in minutes): 61   Total Billable Time (in minutes): 61    FOTO:  Intake Score: 31%  Survey Score 2: 50%  Survey Score 3:  %    Precautions: none    Subjective   Patient reports the swelling is coming down from her long walk recently. She has some zingers and periodic pain, but otherwise ok.  Pain reported as 2/10. Right knee    Objective   Range of Motion:   Knee Right Active   Flexion Post: 91 degrees AROM     Treatment:  Balance/Neuromuscular Re-Education  NMR 1: Patient education: HEP compliance, swelling management, ROM expectations post TKA, importance of extension ROM for proper gait cycle  NMR 2: [place] NMES 10 sec on/20 sec off including: SAQs with medium bolster x 10 minutes, SAQs with 1/2 foam x 10 minutes, LAQs at EOM x 12 minutes  NMR 6: Ambulation with SPC in clinic with emphasis on proper gait cycle. Educated on use of cane in Left hand, avoiding ER of Right foot and terminal knee extension.  NMR 7: standing march, hip abduction: 3 x 10 reps each  Therapeutic Activity  TA 1: Education on TKA rehab, signs/sx of infection/DVT, edema management, quad function, NMES  TA 2: Nustep for CV and LE endurance: 10 minutes, Level 2  TA 3: step ups onto 4-inch step: 2 x 10 reps  TA 4: sit to stands  from elevated surface: 1 x 10 reps without UE support    Time Entry(in minutes):  E-Stim (Unattended) Time Entry: 12  Neuromuscular Re-Education Time Entry: 38  Therapeutic Activity Time Entry: 23    Assessment & Plan   Assessment: Continues to have discomfort with knee flexion measuring 91 degrees AROM post session. Continued NMES for long arc quads. Emphasized importance of working on knee flexion ROM at home.  Evaluation/Treatment Tolerance: Patient tolerated treatment well    The patient will continue to benefit from skilled outpatient physical therapy in order to address the deficits listed in the problem list on the initial evaluation, provide patient and family education, and maximize the patients level of independence in the home and community environments.     The patient's spiritual, cultural, and educational needs were considered, and the patient is agreeable to the plan of care and goals.           Plan: Will continue per POC towards treatment goals. PT/PTA met face to face to discuss patient's treatment plan and progress towards established goals. Patient will be seen by physical therapist every sixth visit and minimally once per month.    Goals:   Active       Ambulation/movement       Patient will walk with community distances with normal gait pattern in order to demonstrate improved functional ability.  (Progressing)       Start:  05/09/25    Expected End:  06/20/25            Patient will navigate 1 flight of stairs without difficulty in order to demonstrate improved functional ability.  (Progressing)       Start:  05/09/25    Expected End:  06/20/25               Changing body position       Patient will demonstrate moving sit to stand 12 reps in 30 seconds in order to demonstrate improved transfer ability.  (Progressing)       Start:  05/09/25    Expected End:  06/20/25               Functional outcome       Patient will demonstrate independence in home program for support of progression  (Progressing)       Start:  05/09/25    Expected End:  06/06/25               Home activities       Patient will perform household indoor maintenance without difficulty in order to demonstrate improved functional ability.  (Progressing)       Start:  05/09/25    Expected End:  06/20/25               Range of Motion       Patient will achieve right knee ROM of  0-120 degrees in order to improve gait and transfer ability.  (Progressing)       Start:  05/09/25    Expected End:  06/20/25               Strength       Patient will achieve right knee flexion strength of >/= 4+/5 in order to demonstrate improved knee stability.  (Progressing)       Start:  05/09/25    Expected End:  06/20/25            Patient will achieve right knee extension strength of >/= 4+/5 in order to demonstrate improved knee stability.  (Progressing)       Start:  05/09/25    Expected End:  06/20/25                Velvet Puente, PTA

## 2025-05-26 ENCOUNTER — CLINICAL SUPPORT (OUTPATIENT)
Dept: REHABILITATION | Facility: HOSPITAL | Age: 60
End: 2025-05-26
Payer: COMMERCIAL

## 2025-05-26 DIAGNOSIS — M17.11 PRIMARY OSTEOARTHRITIS OF RIGHT KNEE: Primary | ICD-10-CM

## 2025-05-26 DIAGNOSIS — M25.561 CHRONIC PAIN OF RIGHT KNEE: ICD-10-CM

## 2025-05-26 DIAGNOSIS — R26.2 DIFFICULTY WALKING: ICD-10-CM

## 2025-05-26 DIAGNOSIS — G89.29 CHRONIC PAIN OF RIGHT KNEE: ICD-10-CM

## 2025-05-26 PROCEDURE — 97530 THERAPEUTIC ACTIVITIES: CPT | Mod: CQ

## 2025-05-26 PROCEDURE — 97014 ELECTRIC STIMULATION THERAPY: CPT | Mod: CQ

## 2025-05-26 PROCEDURE — 97112 NEUROMUSCULAR REEDUCATION: CPT | Mod: CQ

## 2025-05-26 NOTE — PROGRESS NOTES
"  Outpatient Rehab    Physical Therapy Visit    Patient Name: Erin Johnson  MRN: [id number]  YOB: 1965  Encounter Date: 5/26/2025    Therapy Diagnosis:   Encounter Diagnoses   Name Primary?    Primary osteoarthritis of right knee Yes    Difficulty walking     Chronic pain of right knee      Physician: Babatunde Cotto MD    Physician Orders: Eval and Treat  Medical Diagnosis: Unilateral primary osteoarthritis, right knee    Visit # / Visits Authorized:  8 / 20  Insurance Authorization Period: 3/26/2025 to 12/31/2025  Date of Evaluation: 5/8/2025  Plan of Care Certification: 5/8/2025 to 6/20/2025      PT/PTA: PTA   Number of PTA visits since last PT visit:3  Time In: 1230   Time Out: 1331  Total Time (in minutes): 61   Total Billable Time (in minutes): 61    FOTO:  Intake Score: 31%  Survey Score 2: 50%  Survey Score 3:  %    Precautions: None    Subjective   Patient reports she is stressed out today. Knee is still "coming alive" and she feels discomfort like "tingling" and "numbness".  Pain reported as 0/10. Right knee    Objective   Range of Motion:   Knee Right Active   Flexion Post: 106 degrees AAROM     Treatment:  Balance/Neuromuscular Re-Education  NMR 1: Patient education: HEP compliance, swelling management, ROM expectations post TKA, importance of extension ROM for proper gait cycle  NMR 2: Portable NMES 10 sec on/20 sec off including: LAQs at EOM + 2 lb AW x 12 minutes  NMR 7: standing march, hip abduction: 3 x 10 reps each  Therapeutic Activity  TA 1: Education on TKA rehab, signs/sx of infection/DVT, edema management, quad function, NMES  TA 2: Nustep for CV and LE endurance: 5 minutes, Level 3.5 -- Recumbent Bike half to full revolutions, 5 minutes (seat 15)  TA 3: step ups onto 4-inch step: 3 x 10 reps  TA 4: sit to stands from elevated surface: 2 x 10 reps without UE support    Time Entry(in minutes):  E-Stim (Unattended) Time Entry: 12  Neuromuscular Re-Education Time Entry: " 23  Therapeutic Activity Time Entry: 38    Assessment & Plan   Assessment: Able to achieve 106 degrees AAROM Flexion this session. Addition of half circles to full revolutions on the recumbent bike with difficulty at first. Continued NMES with addition of 2 lb AW for long arc quads with good tolerance overall.  Evaluation/Treatment Tolerance: Patient tolerated treatment well    The patient will continue to benefit from skilled outpatient physical therapy in order to address the deficits listed in the problem list on the initial evaluation, provide patient and family education, and maximize the patients level of independence in the home and community environments.     The patient's spiritual, cultural, and educational needs were considered, and the patient is agreeable to the plan of care and goals.           Plan: Will continue per POC towards treatment goals. PT/PTA met face to face to discuss patient's treatment plan and progress towards established goals. Patient will be seen by physical therapist every sixth visit and minimally once per month.    Goals:   Active       Ambulation/movement       Patient will walk with community distances with normal gait pattern in order to demonstrate improved functional ability.  (Progressing)       Start:  05/09/25    Expected End:  06/20/25            Patient will navigate 1 flight of stairs without difficulty in order to demonstrate improved functional ability.  (Progressing)       Start:  05/09/25    Expected End:  06/20/25               Changing body position       Patient will demonstrate moving sit to stand 12 reps in 30 seconds in order to demonstrate improved transfer ability.  (Progressing)       Start:  05/09/25    Expected End:  06/20/25               Functional outcome       Patient will demonstrate independence in home program for support of progression (Progressing)       Start:  05/09/25    Expected End:  06/06/25               Home activities       Patient will  perform household indoor maintenance without difficulty in order to demonstrate improved functional ability.  (Progressing)       Start:  05/09/25    Expected End:  06/20/25               Range of Motion       Patient will achieve right knee ROM of  0-120 degrees in order to improve gait and transfer ability.  (Progressing)       Start:  05/09/25    Expected End:  06/20/25               Strength       Patient will achieve right knee flexion strength of >/= 4+/5 in order to demonstrate improved knee stability.  (Progressing)       Start:  05/09/25    Expected End:  06/20/25            Patient will achieve right knee extension strength of >/= 4+/5 in order to demonstrate improved knee stability.  (Progressing)       Start:  05/09/25    Expected End:  06/20/25                Velvet Puente, PTA

## 2025-05-28 ENCOUNTER — CLINICAL SUPPORT (OUTPATIENT)
Dept: REHABILITATION | Facility: HOSPITAL | Age: 60
End: 2025-05-28
Payer: COMMERCIAL

## 2025-05-28 DIAGNOSIS — R26.2 DIFFICULTY WALKING: ICD-10-CM

## 2025-05-28 DIAGNOSIS — Z96.651 S/P TOTAL KNEE REPLACEMENT, RIGHT: ICD-10-CM

## 2025-05-28 DIAGNOSIS — E78.2 MIXED HYPERLIPIDEMIA: ICD-10-CM

## 2025-05-28 DIAGNOSIS — M17.11 PRIMARY OSTEOARTHRITIS OF RIGHT KNEE: Primary | ICD-10-CM

## 2025-05-28 DIAGNOSIS — G89.29 CHRONIC PAIN OF RIGHT KNEE: ICD-10-CM

## 2025-05-28 DIAGNOSIS — M25.561 CHRONIC PAIN OF RIGHT KNEE: ICD-10-CM

## 2025-05-28 PROCEDURE — 97530 THERAPEUTIC ACTIVITIES: CPT

## 2025-05-28 PROCEDURE — 97140 MANUAL THERAPY 1/> REGIONS: CPT

## 2025-05-28 PROCEDURE — 97112 NEUROMUSCULAR REEDUCATION: CPT

## 2025-05-28 RX ORDER — ASPIRIN 81 MG/1
81 TABLET ORAL 2 TIMES DAILY
Qty: 60 TABLET | Refills: 0 | OUTPATIENT
Start: 2025-05-28 | End: 2025-06-27

## 2025-05-28 RX ORDER — PANTOPRAZOLE SODIUM 40 MG/1
40 TABLET, DELAYED RELEASE ORAL DAILY
Qty: 30 TABLET | Refills: 0 | OUTPATIENT
Start: 2025-05-28 | End: 2025-06-27

## 2025-05-28 RX ORDER — PRAVASTATIN SODIUM 10 MG/1
10 TABLET ORAL DAILY
Qty: 90 TABLET | Refills: 3 | Status: SHIPPED | OUTPATIENT
Start: 2025-05-28 | End: 2026-05-28

## 2025-05-28 NOTE — PROGRESS NOTES
Outpatient Rehab    Physical Therapy Visit    Patient Name: Erin Johnson  MRN: [id number]  YOB: 1965  Encounter Date: 5/21/2025    Therapy Diagnosis:   Encounter Diagnoses   Name Primary?    Primary osteoarthritis of right knee Yes    Difficulty walking     Chronic pain of right knee      Physician: Babatunde Cotto MD    Physician Orders: Eval and Treat  Medical Diagnosis: Unilateral primary osteoarthritis, right knee    Visit # / Visits Authorized:  8 / 20  Insurance Authorization Period: 3/26/2025 to 12/31/2025  Date of Evaluation: 5/8/2025  Plan of Care Certification: 5/8/2025 to 6/20/2025      PT/PTA:     Number of PTA visits since last PT visit:   Time In: 1300   Time Out: 1400  Total Time (in minutes): 60   Total Billable Time (in minutes): 60    FOTO:  Intake Score:  %  Survey Score 2:  %  Survey Score 3:  %    Precautions:       Subjective   Pt reports she walked 1 mile in [place] yesterday for exercise. She says she followed this up with trying to drive for the first time. Pt states that she overdid it and is hurting and swollen today..  Pain reported as 6/10.      Objective            Treatment:  Balance/Neuromuscular Re-Education  NMR 2: NMES: 10 sec on/30 sec off - SAQs with half foam roll x 10 minutes  NMR 4: Heel slides to improve tissue extensibility and ROM: 10 second hold, 5 minutes of reps -- seated heel slides at EOM: 10 second hold, 5 minutes  NMR 7: standing marching, hip abduction: 3 x 10 reps each  Therapeutic Activity  TA 1: Education on TKA rehab, signs/sx of infection/DVT, edema management, quad function, NMES  TA 2: Nustep for endogenous release of endorphins 10 minutes    Time Entry(in minutes):  Neuromuscular Re-Education Time Entry: 30  Therapeutic Activity Time Entry: 30    Assessment & Plan   Assessment: Pt presents today with increased knee pain levels following increased activity levels yesterday. She had difficulty with performance of exercises due to knee  pain. Standing marching and hip abduction initiated this visit. No progressions made to other exercises this date.       The patient will continue to benefit from skilled outpatient physical therapy in order to address the deficits listed in the problem list on the initial evaluation, provide patient and family education, and maximize the patients level of independence in the home and community environments.     The patient's spiritual, cultural, and educational needs were considered, and the patient is agreeable to the plan of care and goals.           Plan: Continue with established POC.    Goals:   Active       Ambulation/movement       Patient will walk with community distances with normal gait pattern in order to demonstrate improved functional ability.  (Progressing)       Start:  05/09/25    Expected End:  06/20/25            Patient will navigate 1 flight of stairs without difficulty in order to demonstrate improved functional ability.  (Progressing)       Start:  05/09/25    Expected End:  06/20/25               Changing body position       Patient will demonstrate moving sit to stand 12 reps in 30 seconds in order to demonstrate improved transfer ability.  (Progressing)       Start:  05/09/25    Expected End:  06/20/25               Functional outcome       Patient will demonstrate independence in home program for support of progression (Progressing)       Start:  05/09/25    Expected End:  06/06/25               Home activities       Patient will perform household indoor maintenance without difficulty in order to demonstrate improved functional ability.  (Progressing)       Start:  05/09/25    Expected End:  06/20/25               Range of Motion       Patient will achieve right knee ROM of  0-120 degrees in order to improve gait and transfer ability.  (Progressing)       Start:  05/09/25    Expected End:  06/20/25               Strength       Patient will achieve right knee flexion strength of >/= 4+/5  in order to demonstrate improved knee stability.  (Progressing)       Start:  05/09/25    Expected End:  06/20/25            Patient will achieve right knee extension strength of >/= 4+/5 in order to demonstrate improved knee stability.  (Progressing)       Start:  05/09/25    Expected End:  06/20/25                Jones Ritchie, PT, DPT

## 2025-05-29 NOTE — PROGRESS NOTES
Outpatient Rehab    Physical Therapy Progress Note    Patient Name: Erin Johnson  MRN: [id number]  YOB: 1965  Encounter Date: 5/28/2025    Therapy Diagnosis:   Encounter Diagnoses   Name Primary?    Primary osteoarthritis of right knee Yes    Difficulty walking     Chronic pain of right knee      Physician: Babatunde Cotto MD    Physician Orders: Eval and Treat  Medical Diagnosis: Unilateral primary osteoarthritis, right knee    Visit # / Visits Authorized:  9 / 20  Insurance Authorization Period: 3/26/2025 to 12/31/2025  Date of Evaluation: 5/8/2025  Plan of Care Certification: 5/8/2025 to 6/20/2025      PT/PTA:     Number of PTA visits since last PT visit:   Time In: 1300   Time Out: 1400  Total Time (in minutes): 60   Total Billable Time (in minutes): 60    FOTO:  Intake Score:  %  Survey Score 2:  %  Survey Score 3:  %    Precautions:       Subjective   Pt states that she has returned to driving. She says that her knee has been getting better..  Pain reported as 4/10.      Objective       Knee Range of Motion   Right Knee   Active (deg) Passive (deg) Pain   Flexion 105       Extension 0                       Treatment:  Manual Therapy  MT 1: grade 2 patellofemoral mobilizations in all planes  Balance/Neuromuscular Re-Education  NMR 4: Heel slides to improve tissue extensibility and ROM: 10 second hold, 5 minutes of reps -- seated heel slides at EOM: 10 second hold, 5 minutes  NMR 7: standing marching and hip abduction: 3 x 10 reps each  Therapeutic Activity  TA 1: Education on TKA rehab, signs/sx of infection/DVT, edema management, quad function, NMES  TA 2: Nustep for endogenous release of endorphins 5 minutes then recumbent bike for 7 minutes  TA 3: step ups onto 4-inch step: 3 x 10 reps  TA 4: sit to stands from chair with 2 airex pads 2 x 10 reps without UE support    Time Entry(in minutes):  Manual Therapy Time Entry: 8  Neuromuscular Re-Education Time Entry: 25  Therapeutic Activity  Time Entry: 27    Assessment & Plan   Assessment: Pt has attended 9 PT visits since inital evaluation. Pt's gait ability, transfer ability, and ROM are all progressing well. She continues to have functional limitations and impairments consistent with post op status. Pt is progressing well towards established goals. She would benefit from continued physical therapy services to address impairments and improve her current level of function.       The patient will continue to benefit from skilled outpatient physical therapy in order to address the deficits listed in the problem list on the initial evaluation, provide patient and family education, and maximize the patients level of independence in the home and community environments.     The patient's spiritual, cultural, and educational needs were considered, and the patient is agreeable to the plan of care and goals.           Plan: Continue with established POC.    Goals:   Active       Ambulation/movement       Patient will walk with community distances with normal gait pattern in order to demonstrate improved functional ability.  (Progressing)       Start:  05/09/25    Expected End:  06/20/25            Patient will navigate 1 flight of stairs without difficulty in order to demonstrate improved functional ability.  (Progressing)       Start:  05/09/25    Expected End:  06/20/25               Changing body position       Patient will demonstrate moving sit to stand 12 reps in 30 seconds in order to demonstrate improved transfer ability.  (Progressing)       Start:  05/09/25    Expected End:  06/20/25               Functional outcome       Patient will demonstrate independence in home program for support of progression (Progressing)       Start:  05/09/25    Expected End:  06/06/25               Home activities       Patient will perform household indoor maintenance without difficulty in order to demonstrate improved functional ability.  (Progressing)       Start:   05/09/25    Expected End:  06/20/25               Range of Motion       Patient will achieve right knee ROM of  0-120 degrees in order to improve gait and transfer ability.  (Progressing)       Start:  05/09/25    Expected End:  06/20/25               Strength       Patient will achieve right knee flexion strength of >/= 4+/5 in order to demonstrate improved knee stability.  (Progressing)       Start:  05/09/25    Expected End:  06/20/25            Patient will achieve right knee extension strength of >/= 4+/5 in order to demonstrate improved knee stability.  (Progressing)       Start:  05/09/25    Expected End:  06/20/25                Jones Ritchie PT, DPT

## 2025-05-30 ENCOUNTER — CLINICAL SUPPORT (OUTPATIENT)
Dept: REHABILITATION | Facility: HOSPITAL | Age: 60
End: 2025-05-30
Payer: COMMERCIAL

## 2025-05-30 DIAGNOSIS — G89.29 CHRONIC PAIN OF RIGHT KNEE: ICD-10-CM

## 2025-05-30 DIAGNOSIS — M25.561 CHRONIC PAIN OF RIGHT KNEE: ICD-10-CM

## 2025-05-30 DIAGNOSIS — R26.2 DIFFICULTY WALKING: ICD-10-CM

## 2025-05-30 DIAGNOSIS — M17.11 PRIMARY OSTEOARTHRITIS OF RIGHT KNEE: Primary | ICD-10-CM

## 2025-05-30 PROCEDURE — 97112 NEUROMUSCULAR REEDUCATION: CPT | Mod: CQ

## 2025-05-30 PROCEDURE — 97530 THERAPEUTIC ACTIVITIES: CPT | Mod: CQ

## 2025-05-30 NOTE — PROGRESS NOTES
Outpatient Rehab    Physical Therapy Visit    Patient Name: Erin Johnson  MRN: [id number]  YOB: 1965  Encounter Date: 5/30/2025    Therapy Diagnosis:   Encounter Diagnoses   Name Primary?    Primary osteoarthritis of right knee Yes    Difficulty walking     Chronic pain of right knee      Physician: Babatunde Cotto MD    Physician Orders: Eval and Treat  Medical Diagnosis: Unilateral primary osteoarthritis, right knee    Visit # / Visits Authorized:  10 / 20  Insurance Authorization Period: 3/26/2025 to 12/31/2025  Date of Evaluation: 5/8/2025  Plan of Care Certification: 5/8/2025 to 6/20/2025      PT/PTA: PTA   Number of PTA visits since last PT visit:1  Time In: 1230   Time Out: 1340  Total Time (in minutes): 70   Total Billable Time (in minutes): 65    FOTO:  Intake Score: 31%  Survey Score 2: 50%  Survey Score 3:  %    Precautions: None    Subjective   Patient reports increased pain in the outside of her knee that is worse with bending. She walked around [place] yesterday, but did not feel like she swelled up as much as the first time. She was able to walk up her back stairs in a normal pattern.  Pain reported as 0/10. Right knee    Objective  Objective Measures updated at progress report unless specified.     Treatment:  Balance/Neuromuscular Re-Education  NMR 1: Patient education: HEP compliance, swelling management, ROM expectations post TKA, importance of extension ROM for proper gait cycle  NMR 3: Heel prop to improve tissue extensibility and fascilitate quad activation x 3 minutes  NMR 5: Quad sets, SAQs with 1/2 foam, LAQs at EOM: 5-10 second hold, 2 x 10  reps  NMR 6: Ambulation with and without SPC in clinic with emphasis on proper gait cycle. Educated on use of cane in Left hand, avoiding ER of Right foot and terminal knee extension.  NMR 7: standing marching and hip abduction: 3 x 10 reps each  Therapeutic Activity  TA 1: Education on TKA rehab, signs/sx of  infection/DVT, edema management, quad function, NMES  TA 2: Nustep for endogenous release of endorphins 4 minutes, Level 2.3 then Recumbent Bike for 5-10 minutes, half to full revolutions  TA 3: step ups onto 4-inch step: 3 x 10 reps    Time Entry(in minutes):  Neuromuscular Re-Education Time Entry: 42  Therapeutic Activity Time Entry: 23    Assessment & Plan   Assessment: Requested to hold knee flexion measurements. She notes lateral knee pain with bending at beginning of session. Performed quad sets, SAQs and LAQs with education on importance of HEP compliance since she reports only doing heel slides at home. Able to complete half to full revolutions on recumbent bike post session.  Evaluation/Treatment Tolerance: Patient tolerated treatment well    The patient will continue to benefit from skilled outpatient physical therapy in order to address the deficits listed in the problem list on the initial evaluation, provide patient and family education, and maximize the patients level of independence in the home and community environments.     The patient's spiritual, cultural, and educational needs were considered, and the patient is agreeable to the plan of care and goals.           Plan: Will continue per POC towards treatment goals. PT/PTA met face to face to discuss patient's treatment plan and progress towards established goals. Patient will be seen by physical therapist every sixth visit and minimally once per month.    Goals:   Active       Ambulation/movement       Patient will walk with community distances with normal gait pattern in order to demonstrate improved functional ability.  (Progressing)       Start:  05/09/25    Expected End:  06/20/25            Patient will navigate 1 flight of stairs without difficulty in order to demonstrate improved functional ability.  (Progressing)       Start:  05/09/25    Expected End:  06/20/25               Changing body position       Patient will demonstrate moving  sit to stand 12 reps in 30 seconds in order to demonstrate improved transfer ability.  (Progressing)       Start:  05/09/25    Expected End:  06/20/25               Functional outcome       Patient will demonstrate independence in home program for support of progression (Progressing)       Start:  05/09/25    Expected End:  06/06/25               Home activities       Patient will perform household indoor maintenance without difficulty in order to demonstrate improved functional ability.  (Progressing)       Start:  05/09/25    Expected End:  06/20/25               Range of Motion       Patient will achieve right knee ROM of  0-120 degrees in order to improve gait and transfer ability.  (Progressing)       Start:  05/09/25    Expected End:  06/20/25               Strength       Patient will achieve right knee flexion strength of >/= 4+/5 in order to demonstrate improved knee stability.  (Progressing)       Start:  05/09/25    Expected End:  06/20/25            Patient will achieve right knee extension strength of >/= 4+/5 in order to demonstrate improved knee stability.  (Progressing)       Start:  05/09/25    Expected End:  06/20/25                Velvet Puente, PTA

## 2025-06-02 ENCOUNTER — CLINICAL SUPPORT (OUTPATIENT)
Dept: REHABILITATION | Facility: HOSPITAL | Age: 60
End: 2025-06-02
Payer: COMMERCIAL

## 2025-06-02 DIAGNOSIS — G89.29 CHRONIC PAIN OF RIGHT KNEE: ICD-10-CM

## 2025-06-02 DIAGNOSIS — M17.11 PRIMARY OSTEOARTHRITIS OF RIGHT KNEE: Primary | ICD-10-CM

## 2025-06-02 DIAGNOSIS — M25.561 CHRONIC PAIN OF RIGHT KNEE: ICD-10-CM

## 2025-06-02 DIAGNOSIS — R26.2 DIFFICULTY WALKING: ICD-10-CM

## 2025-06-02 PROCEDURE — 97112 NEUROMUSCULAR REEDUCATION: CPT

## 2025-06-02 PROCEDURE — 97530 THERAPEUTIC ACTIVITIES: CPT

## 2025-06-04 ENCOUNTER — CLINICAL SUPPORT (OUTPATIENT)
Dept: REHABILITATION | Facility: HOSPITAL | Age: 60
End: 2025-06-04
Payer: COMMERCIAL

## 2025-06-04 DIAGNOSIS — G89.29 CHRONIC PAIN OF RIGHT KNEE: ICD-10-CM

## 2025-06-04 DIAGNOSIS — M17.11 PRIMARY OSTEOARTHRITIS OF RIGHT KNEE: Primary | ICD-10-CM

## 2025-06-04 DIAGNOSIS — R26.2 DIFFICULTY WALKING: ICD-10-CM

## 2025-06-04 DIAGNOSIS — M25.561 CHRONIC PAIN OF RIGHT KNEE: ICD-10-CM

## 2025-06-04 PROCEDURE — 97112 NEUROMUSCULAR REEDUCATION: CPT

## 2025-06-04 PROCEDURE — 97530 THERAPEUTIC ACTIVITIES: CPT

## 2025-06-06 ENCOUNTER — CLINICAL SUPPORT (OUTPATIENT)
Dept: REHABILITATION | Facility: HOSPITAL | Age: 60
End: 2025-06-06
Payer: COMMERCIAL

## 2025-06-06 DIAGNOSIS — M25.561 CHRONIC PAIN OF RIGHT KNEE: ICD-10-CM

## 2025-06-06 DIAGNOSIS — R26.2 DIFFICULTY WALKING: ICD-10-CM

## 2025-06-06 DIAGNOSIS — M17.11 PRIMARY OSTEOARTHRITIS OF RIGHT KNEE: Primary | ICD-10-CM

## 2025-06-06 DIAGNOSIS — G89.29 CHRONIC PAIN OF RIGHT KNEE: ICD-10-CM

## 2025-06-06 PROCEDURE — 97112 NEUROMUSCULAR REEDUCATION: CPT

## 2025-06-06 PROCEDURE — 97530 THERAPEUTIC ACTIVITIES: CPT

## 2025-06-10 ENCOUNTER — CLINICAL SUPPORT (OUTPATIENT)
Dept: REHABILITATION | Facility: HOSPITAL | Age: 60
End: 2025-06-10
Payer: COMMERCIAL

## 2025-06-10 DIAGNOSIS — M25.561 CHRONIC PAIN OF RIGHT KNEE: ICD-10-CM

## 2025-06-10 DIAGNOSIS — M17.11 PRIMARY OSTEOARTHRITIS OF RIGHT KNEE: Primary | ICD-10-CM

## 2025-06-10 DIAGNOSIS — G89.29 CHRONIC PAIN OF RIGHT KNEE: ICD-10-CM

## 2025-06-10 DIAGNOSIS — R26.2 DIFFICULTY WALKING: ICD-10-CM

## 2025-06-10 PROCEDURE — 97112 NEUROMUSCULAR REEDUCATION: CPT | Mod: CQ

## 2025-06-10 PROCEDURE — 97530 THERAPEUTIC ACTIVITIES: CPT | Mod: CQ

## 2025-06-10 NOTE — PROGRESS NOTES
"  Outpatient Rehab    Physical Therapy Visit    Patient Name: Erin Johnson  MRN: [id number]  YOB: 1965  Encounter Date: 6/6/2025    Therapy Diagnosis:   Encounter Diagnoses   Name Primary?    Primary osteoarthritis of right knee Yes    Difficulty walking     Chronic pain of right knee      Physician: Babatunde Cotto MD    Physician Orders: Eval and Treat  Medical Diagnosis: Unilateral primary osteoarthritis, right knee  Surgical Diagnosis: Not applicable for this Episode   Surgical Date: Not applicable for this Episode    Visit # / Visits Authorized:  14 / 20  Insurance Authorization Period: 3/26/2025 to 12/31/2025  Date of Evaluation: 5/8/2025  Plan of Care Certification: 5/8/2025 to 6/20/2025      PT/PTA:     Number of PTA visits since last PT visit:   Time In: 1200   Time Out: 1301  Total Time (in minutes): 61   Total Billable Time (in minutes): 61    FOTO:  Intake Score:  %  Survey Score 2:  %  Survey Score 3:  %    Precautions:       Subjective   Pt states her knee is feeling better now than it did prior to surgery. Pt states that she has been able to perform more ADLs without knee pain..  Pain reported as 1/10.      Objective            Treatment:  Balance/Neuromuscular Re-Education  NMR 4: Forward and lateral stepping over small hurdles, 5 laps each along countertop  NMR 5: Tandem stance, 3 x 30" with cueing for TKE  NMR 6: LAQs with 3 lb AW, 3 x 10, hold 5 seconds  NMR 7: standing marching, 3 x 10  NMR 8: Standing hip abduction with yellow TB, 3 x 10  NMR 9: Standing TKE with green TB, 4 minutes of reps, hold 5 seconds  NMR 10: Standing knee flexion, 3 x 10  Therapeutic Activity  TA 1: Education on TKA rehab, signs/sx of infection/DVT, edema management, quad function, NMES  TA 2: Recumbent bike 10 minutes (5 minutes half revolutions then 5 minutes full revolutions)  TA 3: step ups onto 6-inch step: 3 x 10 reps  TA 5: DL shuttle press, 50 lbs, 3 x 10 -- SL shuttle press, 12 lbs, 3 x " 10    Time Entry(in minutes):  Neuromuscular Re-Education Time Entry: 38  Therapeutic Activity Time Entry: 23    Assessment & Plan   Assessment: Pt tolerated treatment well. She is progressing well towards established goals. Rosangela navigation initiated today with good response to intervention.       The patient will continue to benefit from skilled outpatient physical therapy in order to address the deficits listed in the problem list on the initial evaluation, provide patient and family education, and maximize the patients level of independence in the home and community environments.     The patient's spiritual, cultural, and educational needs were considered, and the patient is agreeable to the plan of care and goals.           Plan: Continue with established POC.    Goals:   Active       Ambulation/movement       Patient will walk with community distances with normal gait pattern in order to demonstrate improved functional ability.  (Progressing)       Start:  05/09/25    Expected End:  06/20/25            Patient will navigate 1 flight of stairs without difficulty in order to demonstrate improved functional ability.  (Progressing)       Start:  05/09/25    Expected End:  06/20/25               Changing body position       Patient will demonstrate moving sit to stand 12 reps in 30 seconds in order to demonstrate improved transfer ability.  (Progressing)       Start:  05/09/25    Expected End:  06/20/25               Functional outcome       Patient will demonstrate independence in home program for support of progression (Progressing)       Start:  05/09/25    Expected End:  06/06/25               Home activities       Patient will perform household indoor maintenance without difficulty in order to demonstrate improved functional ability.  (Progressing)       Start:  05/09/25    Expected End:  06/20/25               Range of Motion       Patient will achieve right knee ROM of  0-120 degrees in order to improve  gait and transfer ability.  (Progressing)       Start:  05/09/25    Expected End:  06/20/25               Strength       Patient will achieve right knee flexion strength of >/= 4+/5 in order to demonstrate improved knee stability.  (Progressing)       Start:  05/09/25    Expected End:  06/20/25            Patient will achieve right knee extension strength of >/= 4+/5 in order to demonstrate improved knee stability.  (Progressing)       Start:  05/09/25    Expected End:  06/20/25                Jones Ritchie PT

## 2025-06-10 NOTE — PROGRESS NOTES
Outpatient Rehab    Physical Therapy Visit    Patient Name: Erin Johnson  MRN: [id number]  YOB: 1965  Encounter Date: 6/10/2025    Therapy Diagnosis:   Encounter Diagnoses   Name Primary?    Primary osteoarthritis of right knee Yes    Difficulty walking     Chronic pain of right knee      Physician: Babatunde Cotto MD    Physician Orders: Eval and Treat  Medical Diagnosis: Unilateral primary osteoarthritis, right knee  Surgical Diagnosis: Right TKA   Surgical Date: 5/6/2025    Visit # / Visits Authorized:  14 / 20  Insurance Authorization Period: 3/26/2025 to 12/31/2025  Date of Evaluation: 5/8/2025  Plan of Care Certification: 5/8/2025 to 6/20/2025      PT/PTA: PTA   Number of PTA visits since last PT visit:1  Time In: 1300   Time Out: 1400  Total Time (in minutes): 60   Total Billable Time (in minutes): 55    FOTO:  Intake Score: 31%  Survey Score 2: 50%  Survey Score 3:  %    Precautions: none    Subjective   Patient reports she is frustrated due to not sleeping and feeling tired when she tries to do something.  Pain reported as 0/10. Right knee    Objective   Range of Motion:   Knee Right Active   Flexion Post: 114 degrees AAROM     Treatment:  Balance/Neuromuscular Re-Education  NMR 1: Patient education: HEP compliance, swelling management, ROM expectations post TKA, importance of extension ROM for proper gait cycle  NMR 3: heel slides: 10 second hold, 10 reps  NMR 4: Forward stepping over small (6-inch) hurdles, 10 laps each along countertop  NMR 9: Standing TKE with green TB, 4 minutes of reps, hold 5 seconds  Therapeutic Activity  TA 1: Education on TKA rehab, signs/sx of infection/DVT, edema management, quad function, NMES  TA 2: Recumbent Bike for 10 minutes/1 mile, Lvl 1.5 (seat 17)  TA 3: step ups onto 6-inch step: 3 x 10 reps -- lateral step ups onto 6-inch step: 3 x 10 reps  TA 4: sit to stands from chair with airex pad: 2 x 10 reps without UE support  TA 5: DL shuttle press, 62.5  lbs, 3 x 10 reps -- SL shuttle press, 25 lbs, 3 x 10 reps    Time Entry(in minutes):  Hot/Cold Pack Time Entry: 5  Neuromuscular Re-Education Time Entry: 23  Therapeutic Activity Time Entry: 32    Assessment & Plan   Assessment: Overall good tolerance to therapeutic interventions noting adequate muscle response throughout. Educated patient on importance of HEP compliance and general strengthening benefits. Difficulty with shane navigation.  Evaluation/Treatment Tolerance: Patient tolerated treatment well    The patient will continue to benefit from skilled outpatient physical therapy in order to address the deficits listed in the problem list on the initial evaluation, provide patient and family education, and maximize the patients level of independence in the home and community environments.     The patient's spiritual, cultural, and educational needs were considered, and the patient is agreeable to the plan of care and goals.           Plan: Will continue per POC towards treatment goals. PT/PTA met face to face to discuss patient's treatment plan and progress towards established goals. Patient will be seen by physical therapist every sixth visit and minimally once per month.    Goals:   Active       Ambulation/movement       Patient will walk with community distances with normal gait pattern in order to demonstrate improved functional ability.  (Progressing)       Start:  05/09/25    Expected End:  06/20/25            Patient will navigate 1 flight of stairs without difficulty in order to demonstrate improved functional ability.  (Progressing)       Start:  05/09/25    Expected End:  06/20/25               Changing body position       Patient will demonstrate moving sit to stand 12 reps in 30 seconds in order to demonstrate improved transfer ability.  (Progressing)       Start:  05/09/25    Expected End:  06/20/25               Functional outcome       Patient will demonstrate independence in home program for  support of progression (Progressing)       Start:  05/09/25    Expected End:  06/06/25               Home activities       Patient will perform household indoor maintenance without difficulty in order to demonstrate improved functional ability.  (Progressing)       Start:  05/09/25    Expected End:  06/20/25               Range of Motion       Patient will achieve right knee ROM of  0-120 degrees in order to improve gait and transfer ability.  (Progressing)       Start:  05/09/25    Expected End:  06/20/25               Strength       Patient will achieve right knee flexion strength of >/= 4+/5 in order to demonstrate improved knee stability.  (Progressing)       Start:  05/09/25    Expected End:  06/20/25            Patient will achieve right knee extension strength of >/= 4+/5 in order to demonstrate improved knee stability.  (Progressing)       Start:  05/09/25    Expected End:  06/20/25                Velvet Puente, PTA

## 2025-06-12 ENCOUNTER — CLINICAL SUPPORT (OUTPATIENT)
Dept: REHABILITATION | Facility: HOSPITAL | Age: 60
End: 2025-06-12
Payer: COMMERCIAL

## 2025-06-12 DIAGNOSIS — G89.29 CHRONIC PAIN OF RIGHT KNEE: ICD-10-CM

## 2025-06-12 DIAGNOSIS — M17.11 PRIMARY OSTEOARTHRITIS OF RIGHT KNEE: Primary | ICD-10-CM

## 2025-06-12 DIAGNOSIS — M25.561 CHRONIC PAIN OF RIGHT KNEE: ICD-10-CM

## 2025-06-12 DIAGNOSIS — R26.2 DIFFICULTY WALKING: ICD-10-CM

## 2025-06-12 PROCEDURE — 97530 THERAPEUTIC ACTIVITIES: CPT

## 2025-06-12 PROCEDURE — 97112 NEUROMUSCULAR REEDUCATION: CPT

## 2025-06-16 ENCOUNTER — OFFICE VISIT (OUTPATIENT)
Dept: ORTHOPEDICS | Facility: CLINIC | Age: 60
End: 2025-06-16
Payer: COMMERCIAL

## 2025-06-16 ENCOUNTER — HOSPITAL ENCOUNTER (OUTPATIENT)
Dept: RADIOLOGY | Facility: HOSPITAL | Age: 60
Discharge: HOME | End: 2025-06-16
Payer: COMMERCIAL

## 2025-06-16 DIAGNOSIS — Z96.651 S/P TOTAL KNEE REPLACEMENT, RIGHT: ICD-10-CM

## 2025-06-16 DIAGNOSIS — Z96.651 S/P TOTAL KNEE REPLACEMENT, RIGHT: Primary | ICD-10-CM

## 2025-06-16 PROCEDURE — 99999 PR PBB SHADOW E&M-EST. PATIENT-LVL III: CPT | Mod: PBBFAC,,,

## 2025-06-16 PROCEDURE — 73560 X-RAY EXAM OF KNEE 1 OR 2: CPT | Mod: 26,59,LT, | Performed by: RADIOLOGY

## 2025-06-16 PROCEDURE — 1160F RVW MEDS BY RX/DR IN RCRD: CPT | Mod: CPTII,S$GLB,,

## 2025-06-16 PROCEDURE — 1159F MED LIST DOCD IN RCRD: CPT | Mod: CPTII,S$GLB,,

## 2025-06-16 PROCEDURE — 99024 POSTOP FOLLOW-UP VISIT: CPT | Mod: S$GLB,,,

## 2025-06-16 PROCEDURE — 73562 X-RAY EXAM OF KNEE 3: CPT | Mod: 26,RT,, | Performed by: RADIOLOGY

## 2025-06-16 PROCEDURE — 73562 X-RAY EXAM OF KNEE 3: CPT | Mod: TC,RT

## 2025-06-16 NOTE — PROGRESS NOTES
HPI:  Erin Johnson presents for 6-week post-operative visit following a right total knee arthroplasty performed by Dr. Cotto on 5/6/2025. Patient reports an overall satisfactory recovery.   Medications:  Tylenol PRN   Assistive Devices:  None   PT:  Ongoing at [place]   Limitations:  None    Exam:   There were no vitals taken for this visit.   Gait:  Normal without assistive device  Incision: healed, clean, and dry without drainage or erythema   Stability:  Knee stable anterior-posterior varus and valgus stresses, no extensor lag    Current ROM:  2-110  Pre-op ROM:  0-120  PT ROM (06/10/2025):  0-114    Imaging:  Radiographs taken today and reviewed revealing a well fixed and aligned prosthesis.    A/P:  6 weeks s/p right total knee arthroplasty    - Patient's recovery is progressing well with ROM of 02/01/2014 and a well healing incision.   - Outpatient PT: ongoing at [facility]  - Pain medication:  No refill needed   - Follow up in 6 weeks with Dr. Cotto. Pt will call clinic with any problems/concerns.

## 2025-06-17 ENCOUNTER — CLINICAL SUPPORT (OUTPATIENT)
Dept: REHABILITATION | Facility: HOSPITAL | Age: 60
End: 2025-06-17
Payer: COMMERCIAL

## 2025-06-17 DIAGNOSIS — M25.561 CHRONIC PAIN OF RIGHT KNEE: ICD-10-CM

## 2025-06-17 DIAGNOSIS — M17.11 PRIMARY OSTEOARTHRITIS OF RIGHT KNEE: Primary | ICD-10-CM

## 2025-06-17 DIAGNOSIS — R26.2 DIFFICULTY WALKING: ICD-10-CM

## 2025-06-17 DIAGNOSIS — G89.29 CHRONIC PAIN OF RIGHT KNEE: ICD-10-CM

## 2025-06-17 PROCEDURE — 97112 NEUROMUSCULAR REEDUCATION: CPT

## 2025-06-17 PROCEDURE — 97530 THERAPEUTIC ACTIVITIES: CPT

## 2025-06-17 NOTE — PROGRESS NOTES
"    Outpatient Rehab    Physical Therapy Visit    Patient Name: Erin Johnson  MRN: [id number]  YOB: 1965  Encounter Date: 6/17/2025    Therapy Diagnosis:   Encounter Diagnoses   Name Primary?    Primary osteoarthritis of right knee Yes    Difficulty walking     Chronic pain of right knee      Physician: Babatunde Cotto MD    Physician Orders: Eval and Treat  Medical Diagnosis: Unilateral primary osteoarthritis, right knee  Surgical Diagnosis: Right TKA   Surgical Date: 5/6/2025  Days Since Last Surgery: 42    Visit # / Visits Authorized:  16 / 20  Insurance Authorization Period: 3/26/2025 to 12/31/2025  Date of Evaluation: 5/8/2025  Plan of Care Certification: 5/8/2025 to 6/20/2025      PT/PTA:     Number of PTA visits since last PT visit:   Time In: 1300   Time Out: 1401  Total Time (in minutes): 61   Total Billable Time (in minutes): 61    FOTO:  Intake Score:  %  Survey Score 2:  %  Survey Score 3:  %    Precautions:       Subjective   Pt reports she walked 2 miles on Sunday for exercise. She says that she feels frustrated with her ongoing limitations..  Pain reported as 0/10.      Objective            Treatment:  Balance/Neuromuscular Re-Education  NMR 1: Heel prop with 2 lb weight on knee for 5 minutes  NMR 2: SLS with cueing for TKE, 10 x 10" hold  NMR 4: Forward and lateral stepping over small (6-inch) hurdles, 5 laps each along countertop  NMR 5: Tandem stance, 3 x 30" with cueing for TKE  Therapeutic Activity  TA 1: Education on TKA rehab, signs/sx of infection/DVT, edema management, quad function, NMES  TA 2: Recumbent Bike for 10 minutes/1 mile, Lvl 1.5 (seat 17)  TA 3: step ups onto 6-inch step: 3 x 10 reps -- eccentric lateral step down from 4-inch step: 3 x 10 reps  TA 4: sit to stands from chair: 3 x 10 reps without UE support  TA 5: DL shuttle press, 62.5 lbs, 3 x 10 reps -- SL shuttle press, 25 lbs, 3 x 10 reps  TA 6: Gait training for heel strike    Time Entry(in " minutes):  Neuromuscular Re-Education Time Entry: 23  Therapeutic Activity Time Entry: 38    Assessment & Plan   Assessment: Pt tolerated session session well. She continues to have difficulty with TKE in stance phase of gait. Pt provided heavy education on importance of HEP compliance. Pt has difficulty with SLS due to knee pain following but she was able to complete. Pt will be discharged to [facility] at next visit.        The patient will continue to benefit from skilled outpatient physical therapy in order to address the deficits listed in the problem list on the initial evaluation, provide patient and family education, and maximize the patients level of independence in the home and community environments.     The patient's spiritual, cultural, and educational needs were considered, and the patient is agreeable to the plan of care and goals.           Plan: Discharge to [facility] at next visit.    Goals:   Active       Ambulation/movement       Patient will walk with community distances with normal gait pattern in order to demonstrate improved functional ability.  (Progressing)       Start:  05/09/25    Expected End:  06/20/25            Patient will navigate 1 flight of stairs without difficulty in order to demonstrate improved functional ability.  (Progressing)       Start:  05/09/25    Expected End:  06/20/25               Changing body position       Patient will demonstrate moving sit to stand 12 reps in 30 seconds in order to demonstrate improved transfer ability.  (Progressing)       Start:  05/09/25    Expected End:  06/20/25               Functional outcome       Patient will demonstrate independence in home program for support of progression (Progressing)       Start:  05/09/25    Expected End:  06/06/25               Home activities       Patient will perform household indoor maintenance without difficulty in order to demonstrate improved functional ability.  (Met)       Start:  05/09/25    Expected End:   06/20/25    Resolved:  06/17/25            Range of Motion       Patient will achieve right knee ROM of  0-120 degrees in order to improve gait and transfer ability.  (Progressing)       Start:  05/09/25    Expected End:  06/20/25               Strength       Patient will achieve right knee flexion strength of >/= 4+/5 in order to demonstrate improved knee stability.  (Progressing)       Start:  05/09/25    Expected End:  06/20/25            Patient will achieve right knee extension strength of >/= 4+/5 in order to demonstrate improved knee stability.  (Progressing)       Start:  05/09/25    Expected End:  06/20/25                Jones Ritchie PT

## 2025-06-19 ENCOUNTER — CLINICAL SUPPORT (OUTPATIENT)
Dept: REHABILITATION | Facility: HOSPITAL | Age: 60
End: 2025-06-19
Payer: COMMERCIAL

## 2025-06-19 DIAGNOSIS — M17.11 PRIMARY OSTEOARTHRITIS OF RIGHT KNEE: Primary | ICD-10-CM

## 2025-06-19 DIAGNOSIS — M25.561 CHRONIC PAIN OF RIGHT KNEE: ICD-10-CM

## 2025-06-19 DIAGNOSIS — R26.2 DIFFICULTY WALKING: ICD-10-CM

## 2025-06-19 DIAGNOSIS — G89.29 CHRONIC PAIN OF RIGHT KNEE: ICD-10-CM

## 2025-06-19 PROCEDURE — 97110 THERAPEUTIC EXERCISES: CPT

## 2025-06-19 PROCEDURE — 97530 THERAPEUTIC ACTIVITIES: CPT

## 2025-06-19 PROCEDURE — 97112 NEUROMUSCULAR REEDUCATION: CPT

## 2025-06-19 NOTE — PROGRESS NOTES
Outpatient Rehab    Physical Therapy Discharge    Patient Name: Erin Johnson  MRN: [id number]  YOB: 1965  Encounter Date: 6/19/2025    Therapy Diagnosis:   Encounter Diagnoses   Name Primary?    Primary osteoarthritis of right knee Yes    Difficulty walking     Chronic pain of right knee      Physician: Babatunde Cotto MD    Physician Orders: Eval and Treat  Medical Diagnosis: Unilateral primary osteoarthritis, right knee  Surgical Diagnosis: Right TKA   Surgical Date: 5/6/2025  Days Since Last Surgery: 48    Visit # / Visits Authorized:  17 / 20  Insurance Authorization Period: 3/26/2025 to 12/31/2025  Date of Evaluation: 5/8/2025  Plan of Care Certification: 5/8/2025 to 6/20/2025      PT/PTA:     Number of PTA visits since last PT visit:   Time In: 1300   Time Out: 1400  Total Time (in minutes): 60   Total Billable Time (in minutes): 60    FOTO:  Intake Score:  %  Survey Score 2:  %  Survey Score 3:  %    Precautions:       Subjective   Pt reports that she has walked 1.5 miles today from running errands. She says that her knee is hurting from performing heel prop stretch with weights on her knee..  Pain reported as 3/10.      Objective   Knee Observations  Right Knee Observations  Present: Straight Leg Raise Extensor Lag             Knee Range of Motion   Right Knee   Active (deg) Passive (deg) Pain   Flexion 114       Extension -2                          Knee Strength   Right Strength Right Pain Left Strength Left  Pain   Flexion (S2) 4-         Prone Flexion           Extension (L3) 4-           Knee Extensor Lag  Lag Present: Right              Timed Up & Go (TUG)  Time: 11 seconds     An older adult who takes >=12 seconds to complete the TUG is at risk for falling.       Sit to Stand Testing      The patient completed 8 repetitions of a sit to stand transfer in 30 seconds. no BUE support              Treatment:  Therapeutic Exercise  TE 1: ROM/MMT/TUG/30STS  Balance/Neuromuscular  "Re-Education  NMR 2: SLS with cueing for TKE, 10 x 10" hold  NMR 5: Tandem stance, 3 x 30" with cueing for TKE  NMR 7: standing marching, 3 x 10  NMR 8: Standing hip abduction with yellow TB, 3 x 10  NMR 9: Standing TKE with blue ball, 3 minutes of reps, hold 10 seconds  NMR 10: Standing knee flexion, 3 x 10  Therapeutic Activity  TA 1: Education on TKA rehab, signs/sx of infection/DVT, edema management, quad function, NMES  TA 2: Recumbent Bike for 10 minutes/1 mile, Lvl 1.5 (seat 17)    Time Entry(in minutes):  Neuromuscular Re-Education Time Entry: 24  Therapeutic Activity Time Entry: 12  Therapeutic Exercise Time Entry: 24    Assessment & Plan   Assessment: Pt has attended 17 PT visits since initial evaluation. Pt is demonstrating improved ROM, strength, and single leg stability. Pt has ongoing ROM limitations with her knee ROM being measured at -2 to 114 degrees at end of today's visit. Pt provided heavy education on importance of HEP compliance and continuing to strengthen independently. PT explained importance of attaining and maintaining terminal knee extension for gait function. She expressed understanding of this information. Pt's gait function and transfer ability have both improved compared to previous assessments. She was provided updated HEP today. Pt is discharged to [facility] to continue strengthening independently.        The patient's spiritual, cultural, and educational needs were considered, and the patient is agreeable to the plan of care and goals.           Plan: Pt is discharged to [facility].    Goals:   Active       Changing body position       Patient will demonstrate moving sit to stand 12 reps in 30 seconds in order to demonstrate improved transfer ability.  (Unable to Meet)       Start:  05/09/25    Expected End:  06/20/25               Home activities       Patient will perform household indoor maintenance without difficulty in order to demonstrate improved functional ability.  (Met)       Start:  " 05/09/25    Expected End:  06/20/25    Resolved:  06/17/25            Range of Motion       Patient will achieve right knee ROM of  0-120 degrees in order to improve gait and transfer ability.  (Unable to Meet)       Start:  05/09/25    Expected End:  06/20/25               Strength       Patient will achieve right knee flexion strength of >/= 4+/5 in order to demonstrate improved knee stability.  (Unable to Meet)       Start:  05/09/25    Expected End:  06/20/25            Patient will achieve right knee extension strength of >/= 4+/5 in order to demonstrate improved knee stability.  (Unable to Meet)       Start:  05/09/25    Expected End:  06/20/25              Resolved       Ambulation/movement       Patient will walk with community distances with normal gait pattern in order to demonstrate improved functional ability.  (Met)       Start:  05/09/25    Expected End:  06/20/25    Resolved:  06/23/25         Patient will navigate 1 flight of stairs without difficulty in order to demonstrate improved functional ability.  (Met)       Start:  05/09/25    Expected End:  06/20/25    Resolved:  06/23/25            Functional outcome       Patient will demonstrate independence in home program for support of progression (Met)       Start:  05/09/25    Expected End:  06/06/25    Resolved:  06/23/25             Jones Ritchie PT, DPT

## 2025-06-23 ENCOUNTER — TELEPHONE (OUTPATIENT)
Dept: INFECTIOUS DISEASES | Facility: CLINIC | Age: 60
End: 2025-06-23
Payer: COMMERCIAL

## 2025-06-23 ENCOUNTER — PATIENT MESSAGE (OUTPATIENT)
Dept: INFECTIOUS DISEASES | Facility: CLINIC | Age: 60
End: 2025-06-23
Payer: COMMERCIAL

## 2025-06-23 DIAGNOSIS — B20 HUMAN IMMUNODEFICIENCY VIRUS (HIV) DISEASE: Primary | ICD-10-CM

## 2025-06-25 ENCOUNTER — LAB VISIT (OUTPATIENT)
Dept: LAB | Facility: HOSPITAL | Age: 60
End: 2025-06-25
Attending: INTERNAL MEDICINE
Payer: COMMERCIAL

## 2025-06-25 DIAGNOSIS — B20 HUMAN IMMUNODEFICIENCY VIRUS (HIV) DISEASE: ICD-10-CM

## 2025-06-25 LAB
ABSOLUTE EOSINOPHIL (OHS): 0.11 K/UL
ABSOLUTE MONOCYTE (OHS): 0.66 K/UL (ref 0.3–1)
ABSOLUTE NEUTROPHIL COUNT (OHS): 7.25 K/UL (ref 1.8–7.7)
ALBUMIN SERPL BCP-MCNC: 4.5 G/DL (ref 3.5–5.2)
ALP SERPL-CCNC: 81 UNIT/L (ref 40–150)
ALT SERPL W/O P-5'-P-CCNC: 14 UNIT/L (ref 10–44)
ANION GAP (OHS): 10 MMOL/L (ref 8–16)
AST SERPL-CCNC: 19 UNIT/L (ref 11–45)
BASOPHILS # BLD AUTO: 0.05 K/UL
BASOPHILS NFR BLD AUTO: 0.5 %
BILIRUB SERPL-MCNC: 0.6 MG/DL (ref 0.1–1)
BUN SERPL-MCNC: 12 MG/DL (ref 6–20)
CALCIUM SERPL-MCNC: 9.6 MG/DL (ref 8.7–10.5)
CHLORIDE SERPL-SCNC: 106 MMOL/L (ref 95–110)
CHOLEST SERPL-MCNC: 232 MG/DL (ref 120–199)
CHOLEST/HDLC SERPL: 3.9 {RATIO} (ref 2–5)
CO2 SERPL-SCNC: 25 MMOL/L (ref 23–29)
CREAT SERPL-MCNC: 1 MG/DL (ref 0.5–1.4)
ERYTHROCYTE [DISTWIDTH] IN BLOOD BY AUTOMATED COUNT: 12.1 % (ref 11.5–14.5)
GFR SERPLBLD CREATININE-BSD FMLA CKD-EPI: >60 ML/MIN/1.73/M2
GLUCOSE SERPL-MCNC: 94 MG/DL (ref 70–110)
HCT VFR BLD AUTO: 39.2 % (ref 37–48.5)
HDLC SERPL-MCNC: 59 MG/DL (ref 40–75)
HDLC SERPL: 25.4 % (ref 20–50)
HGB BLD-MCNC: 12.9 GM/DL (ref 12–16)
IMM GRANULOCYTES # BLD AUTO: 0.04 K/UL (ref 0–0.04)
IMM GRANULOCYTES NFR BLD AUTO: 0.4 % (ref 0–0.5)
LDLC SERPL CALC-MCNC: 154.4 MG/DL (ref 63–159)
LYMPHOCYTES # BLD AUTO: 2.31 K/UL (ref 1–4.8)
MCH RBC QN AUTO: 29.8 PG (ref 27–31)
MCHC RBC AUTO-ENTMCNC: 32.9 G/DL (ref 32–36)
MCV RBC AUTO: 91 FL (ref 82–98)
NONHDLC SERPL-MCNC: 173 MG/DL
NUCLEATED RBC (/100WBC) (OHS): 0 /100 WBC
PLATELET # BLD AUTO: 269 K/UL (ref 150–450)
PMV BLD AUTO: 11.6 FL (ref 9.2–12.9)
POTASSIUM SERPL-SCNC: 4.3 MMOL/L (ref 3.5–5.1)
PROT SERPL-MCNC: 8.2 GM/DL (ref 6–8.4)
RBC # BLD AUTO: 4.33 M/UL (ref 4–5.4)
RELATIVE EOSINOPHIL (OHS): 1.1 %
RELATIVE LYMPHOCYTE (OHS): 22.2 % (ref 18–48)
RELATIVE MONOCYTE (OHS): 6.3 % (ref 4–15)
RELATIVE NEUTROPHIL (OHS): 69.5 % (ref 38–73)
SODIUM SERPL-SCNC: 141 MMOL/L (ref 136–145)
T PALLIDUM IGG+IGM SER QL: NORMAL
TRIGL SERPL-MCNC: 93 MG/DL (ref 30–150)
WBC # BLD AUTO: 10.42 K/UL (ref 3.9–12.7)

## 2025-06-25 PROCEDURE — 87536 HIV-1 QUANT&REVRSE TRNSCRPJ: CPT

## 2025-06-25 PROCEDURE — 82040 ASSAY OF SERUM ALBUMIN: CPT

## 2025-06-25 PROCEDURE — 36415 COLL VENOUS BLD VENIPUNCTURE: CPT

## 2025-06-25 PROCEDURE — 82465 ASSAY BLD/SERUM CHOLESTEROL: CPT

## 2025-06-25 PROCEDURE — 86361 T CELL ABSOLUTE COUNT: CPT

## 2025-06-25 PROCEDURE — 85025 COMPLETE CBC W/AUTO DIFF WBC: CPT

## 2025-06-25 PROCEDURE — 86593 SYPHILIS TEST NON-TREP QUANT: CPT

## 2025-06-26 LAB
CD3+CD4+ CELLS # SPEC: 886 CELLS/UL (ref 300–1400)
CD3+CD4+ CELLS NFR BLD: 35.47 % (ref 28–57)
HIV1 RNA SERPL NAA+PROBE-LOG#: NOT DETECTED {LOG_COPIES}/ML
LABORATORY COMMENT REPORT: NORMAL

## 2025-06-30 ENCOUNTER — OFFICE VISIT (OUTPATIENT)
Dept: INFECTIOUS DISEASES | Facility: CLINIC | Age: 60
End: 2025-06-30
Payer: COMMERCIAL

## 2025-06-30 VITALS
HEART RATE: 92 BPM | TEMPERATURE: 99 F | BODY MASS INDEX: 29.69 KG/M2 | DIASTOLIC BLOOD PRESSURE: 80 MMHG | HEIGHT: 67 IN | SYSTOLIC BLOOD PRESSURE: 131 MMHG | WEIGHT: 189.13 LBS

## 2025-06-30 DIAGNOSIS — E78.2 MIXED HYPERLIPIDEMIA: ICD-10-CM

## 2025-06-30 DIAGNOSIS — B20 HUMAN IMMUNODEFICIENCY VIRUS (HIV) DISEASE: Primary | ICD-10-CM

## 2025-06-30 PROCEDURE — 99999 PR PBB SHADOW E&M-EST. PATIENT-LVL IV: CPT | Mod: PBBFAC,,, | Performed by: INTERNAL MEDICINE

## 2025-06-30 PROCEDURE — 1159F MED LIST DOCD IN RCRD: CPT | Mod: CPTII,S$GLB,, | Performed by: INTERNAL MEDICINE

## 2025-06-30 PROCEDURE — 3008F BODY MASS INDEX DOCD: CPT | Mod: CPTII,S$GLB,, | Performed by: INTERNAL MEDICINE

## 2025-06-30 PROCEDURE — 3075F SYST BP GE 130 - 139MM HG: CPT | Mod: CPTII,S$GLB,, | Performed by: INTERNAL MEDICINE

## 2025-06-30 PROCEDURE — 3079F DIAST BP 80-89 MM HG: CPT | Mod: CPTII,S$GLB,, | Performed by: INTERNAL MEDICINE

## 2025-06-30 PROCEDURE — 99214 OFFICE O/P EST MOD 30 MIN: CPT | Mod: S$GLB,,, | Performed by: INTERNAL MEDICINE

## 2025-06-30 PROCEDURE — 1160F RVW MEDS BY RX/DR IN RCRD: CPT | Mod: CPTII,S$GLB,, | Performed by: INTERNAL MEDICINE

## 2025-06-30 RX ORDER — PRAVASTATIN SODIUM 20 MG/1
20 TABLET ORAL DAILY
Qty: 90 TABLET | Refills: 3 | Status: SHIPPED | OUTPATIENT
Start: 2025-06-30 | End: 2026-06-30

## 2025-06-30 NOTE — PROGRESS NOTES
Subjective     Patient ID: Erin Johnson is a 60 y.o. female.    Chief Complaint:HIV Positive/AIDS      History of Present Illness    History of Present Illness    CHIEF COMPLAINT:  Patient presents for a follow-up visit to discuss recent lab results, including cholesterol levels, and to address concerns about various health issues.    HPI:  Patient reports recent right knee replacement surgery on May 6th, approximately 2 months ago. She notes ongoing swelling, some discomfort, and visible bruising in the knee. Range of motion has improved from 128 degrees pre-surgery to about 114-115 degrees currently, though she has not reached the target of 120 degrees set by her physical therapist. She has difficulty sitting for long periods and limitations with activities like swimming, particularly kicking. She started driving 3 weeks post-surgery and is working on improving her stamina as she prepares to start a new job in 2 weeks that will require more physical activity.    Patient mentions having a colonoscopy that revealed a few polyps. She expresses concern about the recommended 5-year follow-up, given her family history of colon cancer.    Patient reports a previous issue with her pinky finger, which she chose not to pursue further investigation for. She states that this problem has since resolved on its own.    Patient notes that she has stopped alcohol for the past 2 months, which is the longest period she has gone without regular alcohol since her pregnancy. She reports weight loss, mentioning that her morning weight is now typically 185-186 lbs, though she has not been exercising regularly.    Regarding her HIV status, patient's recent lab results show a CD4 count of 886 and an undetectable viral load.    MEDICATIONS:  Patient is on Pravastatin 10 mg daily for cholesterol management and an antiretroviral medication daily for HIV management.    MEDICAL HISTORY:  She has a history of HIV with an undetectable viral  load.    FAMILY HISTORY:  Family history is significant for her father who  of colon cancer at age 60. Her mother is 89 years old and alive. Patient's sister recently had an accident, and her brother-in-law recently underwent quadruple bypass surgery.    SURGICAL HISTORY:  Patient had a right knee replacement on May 6, 2023. She is experiencing some ongoing swelling and reduced range of motion, but overall improvement is noted.    TEST RESULTS:  Her CD4 count is 886 and viral load is undetectable. Patient's electrolytes, kidney function, liver function, and complete blood count are within normal range. Her cholesterol levels are elevated, with LDL at 154 and total cholesterol above 200. The white blood cell count is slightly elevated compared to the previous day but still within normal range. MCH is elevated, at the upper limit of the normal range. Patient underwent a colonoscopy which revealed a few polyps. The recommendation is for her next colonoscopy to be in 5 years.    SOCIAL HISTORY:  Alcohol: Recently stopped drinking, longest period without alcohol since pregnancy Occupation: Starting a new job in 2 weeks, recently graduated and passed LMSW test         ROS     Objective   Physical Exam       Assessment and Plan     No diagnosis found.    Plan:  ***

## 2025-07-06 NOTE — PROGRESS NOTES
Subjective     Patient ID: Erin Johnson is a 60 y.o. female.    Chief Complaint:HIV Positive/AIDS      History of Present Illness    60 year old female who is living with HIV.  She is here today for routine follow up.    Interval History  - Patient presents for a follow-up visit to discuss recent lab results, including cholesterol levels, and to address concerns about various health issues.    - Patient reports recent right knee replacement surgery on May 6th, approximately 2 months ago. She notes ongoing swelling, some discomfort, and visible bruising in the knee. Range of motion has improved. She has difficulty sitting for long periods and limitations with activities like swimming, particularly kicking. She started driving 3 weeks post-surgery and is working on improving her stamina as she prepares to start a new job in 2 weeks that will require more physical activity.    - Patient mentions having a colonoscopy that revealed a few polyps. She expresses concern about the recommended 5-year follow-up, given her family history of colon cancer.    - Patient reports a previous issue with her pinky finger, which she chose not to pursue further investigation for. She states that this problem has since resolved on its own.    - Patient notes that she has stopped alcohol for the past 2 months, which is the longest period she has gone without regular alcohol since her pregnancy. She reports weight loss, mentioning that her morning weight is now typically 185-186 lbs, though she has not been exercising regularly.    .    MEDICATIONS:  Pravastatin  Dovato    MEDICAL HISTORY:  She has a history of HIV with an undetectable viral load.    FAMILY HISTORY:  Family history is significant for her father who  of colon cancer at age 60. Her mother is 89 years old and alive. Patient's sister recently had an accident, and her brother-in-law recently underwent quadruple bypass surgery.    SURGICAL HISTORY:  Patient had a right knee  replacement on May 6, 2023. She is experiencing some ongoing swelling and reduced range of motion, but overall improvement is noted.    SOCIAL HISTORY:  Alcohol: Recently stopped drinking, longest period without alcohol since pregnancy   Occupation: Starting a new job in 2 weeks, recently graduated and passed LMSW test         Review of Systems   All other systems reviewed and are negative.       Objective   Physical Exam  Vitals and nursing note reviewed.   Constitutional:       General: She is not in acute distress.     Appearance: She is well-developed. She is not diaphoretic.   HENT:      Head: Normocephalic and atraumatic.      Right Ear: External ear normal.      Left Ear: External ear normal.      Nose: Nose normal.      Mouth/Throat:      Pharynx: No oropharyngeal exudate.   Eyes:      General: No scleral icterus.        Right eye: No discharge.         Left eye: No discharge.      Conjunctiva/sclera: Conjunctivae normal.      Pupils: Pupils are equal, round, and reactive to light.   Neck:      Thyroid: No thyromegaly.      Vascular: No JVD.      Trachea: No tracheal deviation.   Cardiovascular:      Rate and Rhythm: Normal rate and regular rhythm.      Heart sounds: No murmur heard.     No friction rub. No gallop.   Pulmonary:      Effort: Pulmonary effort is normal. No respiratory distress.      Breath sounds: Normal breath sounds. No stridor. No wheezing or rales.   Chest:      Chest wall: No tenderness.   Abdominal:      General: Bowel sounds are normal. There is no distension.      Palpations: Abdomen is soft. There is no mass.      Tenderness: There is no abdominal tenderness. There is no guarding or rebound.   Musculoskeletal:         General: No tenderness. Normal range of motion.      Cervical back: Normal range of motion and neck supple.   Lymphadenopathy:      Cervical: No cervical adenopathy.   Skin:     General: Skin is warm.      Coloration: Skin is not pale.      Findings: No erythema or rash.    Neurological:      Mental Status: She is alert and oriented to person, place, and time.      Cranial Nerves: No cranial nerve deficit.      Motor: No abnormal muscle tone.      Coordination: Coordination normal.      Deep Tendon Reflexes: Reflexes normal.   Psychiatric:         Behavior: Behavior normal.         Thought Content: Thought content normal.         Judgment: Judgment normal.            Assessment and Plan     1. Human immunodeficiency virus (HIV) disease    2. Mixed hyperlipidemia      60 year old female living with HIV.  Labs and studies reviewed and discussed with the patient.  She is to continue on dovato.  Her cholesterol is not at goal.  Will increase her dose of pravastatin.      Plan:  Human immunodeficiency virus (HIV) disease    Mixed hyperlipidemia  -     pravastatin (PRAVACHOL) 20 MG tablet; Take 1 tablet (20 mg total) by mouth once daily.  Dispense: 90 tablet; Refill: 3

## 2025-07-28 ENCOUNTER — OFFICE VISIT (OUTPATIENT)
Dept: ORTHOPEDICS | Facility: CLINIC | Age: 60
End: 2025-07-28
Payer: COMMERCIAL

## 2025-07-28 VITALS — WEIGHT: 189.13 LBS | HEIGHT: 67 IN | BODY MASS INDEX: 29.69 KG/M2

## 2025-07-28 DIAGNOSIS — Z96.651 S/P TOTAL KNEE REPLACEMENT, RIGHT: Primary | ICD-10-CM

## 2025-07-28 PROCEDURE — 99024 POSTOP FOLLOW-UP VISIT: CPT | Mod: S$GLB,,, | Performed by: ORTHOPAEDIC SURGERY

## 2025-07-28 PROCEDURE — 99999 PR PBB SHADOW E&M-EST. PATIENT-LVL III: CPT | Mod: PBBFAC,,, | Performed by: ORTHOPAEDIC SURGERY

## 2025-07-28 PROCEDURE — 1159F MED LIST DOCD IN RCRD: CPT | Mod: CPTII,S$GLB,, | Performed by: ORTHOPAEDIC SURGERY

## 2025-07-28 NOTE — PROGRESS NOTES
History of Present Illness    CHIEF COMPLAINT:  - Follow-up s/p right Krzysztof total knee arthroplasty    HPI:  - Presents for follow-up after right Krzysztof total knee arthroplasty performed on May 6th  - Recently moved apartments, causing stress on her knee  - Initially doing well with reduced tingly numbness, but these symptoms have returned  - Reports regression in improvement of sensation and reduction of tingly numbness  - Currently managing symptoms by icing her knee  - Expresses concern about persistent bruising  - Continues exercises independently, including going to the gym, riding a bike, and swimming  - 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  Alert and oriented.  Mood and affect are appropriate.    Knee incision is well healed.  There is a good, stable range of motion and leg lengths are clinically equal. 0-115 The extremity is neurovascularly intact.    Xrays demonstrate a well fixed and positioned prosthesis.      Imp:  Assessment & Plan            Progressing    F/u in 3 months with xrays    This note was generated with the assistance of ambient listening technology. Verbal consent was obtained by the patient and accompanying visitor(s) for the recording of patient appointment to facilitate this note. I attest to having reviewed and edited the generated note for accuracy, though some syntax or spelling errors may persist.  Please contact the author of this note for any clarification.

## (undated) DEVICE — HOOD T7 W/ PEEL AWAY LENS

## (undated) DEVICE — BLADE SURG CARBON STEEL SZ11

## (undated) DEVICE — ADHESIVE DERMABOND ADVANCED

## (undated) DEVICE — KIT TOTAL KNEE TKOFG OMC

## (undated) DEVICE — GLOVE BIOGEL SKINSENSE PI 8.0

## (undated) DEVICE — KIT IRR SUCTION HND PIECE

## (undated) DEVICE — SUT STRATAFIX 3-0 30CM

## (undated) DEVICE — TAPE SURG DURAPORE 2 X10YD

## (undated) DEVICE — GOWN ECLIPSE POLY REINF 3XL

## (undated) DEVICE — BANDAGE MATRIX HK LOOP 4IN 5YD

## (undated) DEVICE — SOL BETADINE 5%

## (undated) DEVICE — GOWN ECLIPSE REINF LV4 TWL 2XL

## (undated) DEVICE — DRESSING AQUACEL FOAM 4X4IN

## (undated) DEVICE — SUT VICRYL PLUS 2-0 CT1 18

## (undated) DEVICE — PENCIL SMK EVAC CONNECTOR 10FT

## (undated) DEVICE — BLADE SAGITTAL 18 X 1.27 X 90M

## (undated) DEVICE — KIT DRAPE RIO ONE PIECE W/POCK

## (undated) DEVICE — BANDAGE MATRIX HK LOOP 6IN 5YD

## (undated) DEVICE — SUT ETHILON 3-0 PS2 18 BLK

## (undated) DEVICE — DRAPE STERI U-SHAPED 47X51IN

## (undated) DEVICE — DRAPE THREE-QTR REINF 53X77IN

## (undated) DEVICE — WRAP KNEE ACCU THERM GEL PACK

## (undated) DEVICE — BONE PINS (4MM X 140MM)
Type: IMPLANTABLE DEVICE | Site: KNEE | Status: NON-FUNCTIONAL
Removed: 2025-05-06

## (undated) DEVICE — PAD DERMAPROX SM 8X11X.5IN

## (undated) DEVICE — ELECTRODE REM PLYHSV RETURN 9

## (undated) DEVICE — PAD CAST SPECIALIST STRL 6

## (undated) DEVICE — TIBIAL BEARING INSERT
Type: IMPLANTABLE DEVICE | Site: KNEE | Status: NON-FUNCTIONAL
Brand: TRIATHLON
Removed: 2025-05-06

## (undated) DEVICE — DRAPE STERI INSTRUMENT 1018

## (undated) DEVICE — SUT VICRYL+ 1 CT1 18IN

## (undated) DEVICE — DRESSING AQUACEL AG ADV 3.5X12

## (undated) DEVICE — KIT VIZADISC KNEE TRACKING

## (undated) DEVICE — BONE PINS (4MM X 110MM)
Type: IMPLANTABLE DEVICE | Site: KNEE | Status: NON-FUNCTIONAL
Removed: 2025-05-06

## (undated) DEVICE — TOWEL OR DISP STRL BLUE 4/PK

## (undated) DEVICE — WRAP PROTECTIVE LEG POS STRL

## (undated) DEVICE — SET CEMENT (SCULP)

## (undated) DEVICE — MIXER BONE CEMENT

## (undated) DEVICE — UNDERGLOVES BIOGEL PI SIZE 8.5

## (undated) DEVICE — DRESSING XEROFORM 1X8IN

## (undated) DEVICE — SUT VICRYL PLUS 0 CT1 18IN

## (undated) DEVICE — KIT CHECKPOINT MAKO

## (undated) DEVICE — TOURNIQUET SB QC DP 34X4IN

## (undated) DEVICE — SOL NACL IRR 1000ML BTL